# Patient Record
Sex: FEMALE | Race: BLACK OR AFRICAN AMERICAN | Employment: OTHER | URBAN - METROPOLITAN AREA
[De-identification: names, ages, dates, MRNs, and addresses within clinical notes are randomized per-mention and may not be internally consistent; named-entity substitution may affect disease eponyms.]

---

## 2023-07-14 ENCOUNTER — HOSPITAL ENCOUNTER (OUTPATIENT)
Dept: PHYSICAL THERAPY | Age: 68
Setting detail: THERAPIES SERIES
Discharge: HOME | End: 2023-07-14
Payer: MEDICARE

## 2023-07-14 PROCEDURE — 97161 PT EVAL LOW COMPLEX 20 MIN: CPT

## 2023-07-14 PROCEDURE — 97110 THERAPEUTIC EXERCISES: CPT

## 2023-07-14 PROCEDURE — 97140 MANUAL THERAPY 1/> REGIONS: CPT

## 2023-07-14 NOTE — FLOWSHEET NOTE
[facility] - Outpatient Rehabilitation and Therapy, [address], [address] [facility]  Phone: [phone]   Fax:     [phone]      Physical Therapy Treatment Note/ Progress Report:     Date:  2023    Patient Name:  Dorothea Muro    :  1955  MRN: [id number]    Pertinent Medical History:Additional Pertinent Hx: HTN, hepatitis    Medical/Treatment Diagnosis Information:  Medical Diagnosis: Hx of total knee replacement, right [Z96.651]  Treatment Diagnosis: Decreased functional mobility following right TKR    Insurance/Certification information:  PT Insurance Information: [address] Medicare  Physician Information:  Mame Olivarez MD  Plan of care signed (Y/N):     Date of Patient follow up with Physician:      Progress Report: []  Yes  [x]  No     Date Range for reporting period:  Beginnin2023  Ending:      Progress report due (10 Rx/or 30 days whichever is less):      Recertification due (POC duration/ or 90 days whichever is less):      Visit # POC/Insurance Allowable Auth Needed    BOMN []Yes   [x]No     Latex Allergy:  [x]NO      []YES  Preferred Language for Healthcare:   [x]English       []Other:    HPI -    Right TKR 23 - to [facility]  for two weeks, then home therapy for 6 days then sent for OPPT.      SUBJECTIVE:      Relevant Medical History:Additional Pertinent Hx: HTN, hepatitis  Functional Scale/Score: WOMAC  19      Pain Scale: 4/10    Knee ext   -22a/-10p   Knee Flex   100a/107p     Gait - antalgic with RW    RESTRICTIONS/PRECAUTIONS: right TKR    Exercises/Interventions:     Therapeutic Ex (41250)   Min:  30 Reps/Resistance Notes/CUES   Nustep 5 min     Knee flexion step 3 min    HSS step add         Standing HR 10    Standing knee flexion 10         Seated  FAQ   10    Fitter seated 10         Supine  SLR  SAQ  Heel slides   10  10  10         Leg Press  Knee Extension  Knee Flexion

## 2023-07-17 ENCOUNTER — APPOINTMENT (OUTPATIENT)
Dept: PHYSICAL THERAPY | Age: 68
End: 2023-07-17
Payer: MEDICARE

## 2023-07-19 ENCOUNTER — APPOINTMENT (OUTPATIENT)
Dept: PHYSICAL THERAPY | Age: 68
End: 2023-07-19
Payer: MEDICARE

## 2023-07-21 ENCOUNTER — HOSPITAL ENCOUNTER (OUTPATIENT)
Dept: PHYSICAL THERAPY | Age: 68
Setting detail: THERAPIES SERIES
Discharge: HOME | End: 2023-07-21
Payer: MEDICARE

## 2023-07-21 PROCEDURE — 97140 MANUAL THERAPY 1/> REGIONS: CPT

## 2023-07-21 PROCEDURE — 97110 THERAPEUTIC EXERCISES: CPT

## 2023-07-21 NOTE — FLOWSHEET NOTE
[facility] - Outpatient Rehabilitation and Therapy, [address], [address] [facility]  Phone: [phone]   Fax:     [phone]      Physical Therapy Treatment Note/ Progress Report:     Date:  2023    Patient Name:  Onel Marie    :  1955  MRN: [id number]    Pertinent Medical History:Additional Pertinent Hx: HTN, hepatitis    Medical/Treatment Diagnosis Information:  Medical Diagnosis: Hx of total knee replacement, right [Z96.651]  Treatment Diagnosis: Decreased functional mobility following right TKR    Insurance/Certification information:     Physician Information:  Lamar Barkley MD  Plan of care signed (Y/N):     Date of Patient follow up with Physician:      Progress Report: []  Yes  [x]  No     Date Range for reporting period:  Beginnin2023  Ending:      Progress report due (10 Rx/or 30 days whichever is less): 3/89     Recertification due (POC duration/ or 90 days whichever is less):      Visit # POC/Insurance Allowable Auth Needed    BOMN []Yes   [x]No     Latex Allergy:  [x]NO      []YES  Preferred Language for Healthcare:   [x]English       []Other:     Relevant Medical History:Additional Pertinent Hx: HTN, hepatitis  Functional Scale/Score: WOMAC  19     HPI -    Right TKR 23 - to [facility]  for two weeks, then home therapy for 6 days then sent for OPPT. SUBJECTIVE:   -  Pain is slowly getting better. Pt was 3 hours late for appt. Pain  4/10 . Reviewed schedule.      Knee ext   -22a/-10p   Knee Flex   100a/107p     Gait - antalgic with RW    RESTRICTIONS/PRECAUTIONS: right TKR    Therapeutic Ex (09016)   Min:  30 Reps/Resistance Notes/CUES  Right TKR 23   Nustep 5 min     Knee flexion step 3 min    HSS step add         Standing HR 10    Standing knee flexion  Standing abduction  Standing hip flexion 1# 10  1# x 10  1# x 10         Seated  FAQ  Knee flexion   2# 2 x 10  Red

## 2023-07-25 ENCOUNTER — APPOINTMENT (OUTPATIENT)
Dept: CT IMAGING | Age: 68
End: 2023-07-25
Payer: MEDICARE

## 2023-07-25 ENCOUNTER — HOSPITAL ENCOUNTER (OUTPATIENT)
Dept: PHYSICAL THERAPY | Age: 68
Setting detail: THERAPIES SERIES
Discharge: HOME | End: 2023-07-25
Payer: MEDICARE

## 2023-07-25 ENCOUNTER — APPOINTMENT (OUTPATIENT)
Dept: GENERAL RADIOLOGY | Age: 68
End: 2023-07-25
Payer: MEDICARE

## 2023-07-25 ENCOUNTER — HOSPITAL ENCOUNTER (INPATIENT)
Age: 68
LOS: 3 days | Discharge: NURSING, REHAB OR LONG-TERM CARE | End: 2023-07-28
Attending: EMERGENCY MEDICINE | Admitting: INTERNAL MEDICINE
Payer: MEDICARE

## 2023-07-25 DIAGNOSIS — R53.1 RIGHT SIDED WEAKNESS: Primary | ICD-10-CM

## 2023-07-25 DIAGNOSIS — I63.9 ACUTE CEREBROVASCULAR ACCIDENT (CVA) (HCC): ICD-10-CM

## 2023-07-25 DIAGNOSIS — R29.818 FOCAL NEUROLOGICAL DEFICIT: ICD-10-CM

## 2023-07-25 DIAGNOSIS — H53.451 PERIPHERAL VISUAL FIELD DEFECT, RIGHT: ICD-10-CM

## 2023-07-25 LAB
ALBUMIN SERPL-MCNC: 4.5 G/DL (ref 3.4–5)
ALBUMIN/GLOB SERPL: 1.1 {RATIO} (ref 1.1–2.2)
ALP SERPL-CCNC: 95 U/L (ref 40–129)
ALT SERPL-CCNC: 8 U/L (ref 10–40)
ANION GAP SERPL CALCULATED.3IONS-SCNC: 11 MMOL/L (ref 3–16)
AST SERPL-CCNC: 15 U/L (ref 15–37)
BASOPHILS # BLD: 0.1 K/UL (ref 0–0.2)
BASOPHILS NFR BLD: 1.3 %
BILIRUB SERPL-MCNC: 0.3 MG/DL (ref 0–1)
BILIRUB UR QL STRIP.AUTO: NEGATIVE
BUN SERPL-MCNC: 17 MG/DL (ref 7–20)
CA-I BLD-SCNC: 1.39 MMOL/L (ref 1.12–1.32)
CALCIUM SERPL-MCNC: 11.8 MG/DL (ref 8.3–10.6)
CHLORIDE SERPL-SCNC: 101 MMOL/L (ref 99–110)
CLARITY UR: CLEAR
CO2 SERPL-SCNC: 24 MMOL/L (ref 21–32)
COLOR UR: YELLOW
CREAT SERPL-MCNC: 1.1 MG/DL (ref 0.6–1.2)
DEPRECATED RDW RBC AUTO: 15.5 % (ref 12.4–15.4)
EOSINOPHIL # BLD: 0.1 K/UL (ref 0–0.6)
EOSINOPHIL NFR BLD: 1.6 %
EPI CELLS #/AREA URNS HPF: ABNORMAL /HPF (ref 0–5)
GFR SERPLBLD CREATININE-BSD FMLA CKD-EPI: 54 ML/MIN/{1.73_M2}
GLUCOSE BLD-MCNC: 104 MG/DL (ref 70–99)
GLUCOSE SERPL-MCNC: 114 MG/DL (ref 70–99)
GLUCOSE UR STRIP.AUTO-MCNC: NEGATIVE MG/DL
HCT VFR BLD AUTO: 37.4 % (ref 36–48)
HGB BLD-MCNC: 12.1 G/DL (ref 12–16)
HGB UR QL STRIP.AUTO: NEGATIVE
HYALINE CASTS #/AREA URNS LPF: ABNORMAL /LPF (ref 0–2)
KETONES UR STRIP.AUTO-MCNC: NEGATIVE MG/DL
LACTATE BLDV-SCNC: 1 MMOL/L (ref 0.4–2)
LEUKOCYTE ESTERASE UR QL STRIP.AUTO: NEGATIVE
LYMPHOCYTES # BLD: 4 K/UL (ref 1–5.1)
LYMPHOCYTES NFR BLD: 45.6 %
MCH RBC QN AUTO: 25.1 PG (ref 26–34)
MCHC RBC AUTO-ENTMCNC: 32.4 G/DL (ref 31–36)
MCV RBC AUTO: 77.4 FL (ref 80–100)
MONOCYTES # BLD: 0.6 K/UL (ref 0–1.3)
MONOCYTES NFR BLD: 7 %
NEUTROPHILS # BLD: 3.9 K/UL (ref 1.7–7.7)
NEUTROPHILS NFR BLD: 44.5 %
NITRITE UR QL STRIP.AUTO: NEGATIVE
PERFORMED ON: ABNORMAL
PH BLDV: 7.41 [PH] (ref 7.35–7.45)
PH UR STRIP.AUTO: 5.5 [PH] (ref 5–8)
PLATELET # BLD AUTO: 370 K/UL (ref 135–450)
PMV BLD AUTO: 7.4 FL (ref 5–10.5)
POTASSIUM SERPL-SCNC: 3.9 MMOL/L (ref 3.5–5.1)
PROT SERPL-MCNC: 8.6 G/DL (ref 6.4–8.2)
PROT UR STRIP.AUTO-MCNC: 100 MG/DL
RBC # BLD AUTO: 4.84 M/UL (ref 4–5.2)
RBC #/AREA URNS HPF: ABNORMAL /HPF (ref 0–4)
SODIUM SERPL-SCNC: 136 MMOL/L (ref 136–145)
SP GR UR STRIP.AUTO: >=1.03 (ref 1–1.03)
TROPONIN, HIGH SENSITIVITY: 10 NG/L (ref 0–14)
TROPONIN, HIGH SENSITIVITY: 13 NG/L (ref 0–14)
TROPONIN, HIGH SENSITIVITY: 14 NG/L (ref 0–14)
UA COMPLETE W REFLEX CULTURE PNL UR: ABNORMAL
UA DIPSTICK W REFLEX MICRO PNL UR: YES
URN SPEC COLLECT METH UR: ABNORMAL
UROBILINOGEN UR STRIP-ACNC: 0.2 E.U./DL
WBC # BLD AUTO: 8.7 K/UL (ref 4–11)
WBC #/AREA URNS HPF: ABNORMAL /HPF (ref 0–5)

## 2023-07-25 PROCEDURE — 2580000003 HC RX 258: Performed by: INTERNAL MEDICINE

## 2023-07-25 PROCEDURE — 81001 URINALYSIS AUTO W/SCOPE: CPT

## 2023-07-25 PROCEDURE — 85025 COMPLETE CBC W/AUTO DIFF WBC: CPT

## 2023-07-25 PROCEDURE — 2060000000 HC ICU INTERMEDIATE R&B

## 2023-07-25 PROCEDURE — 70450 CT HEAD/BRAIN W/O DYE: CPT

## 2023-07-25 PROCEDURE — 84484 ASSAY OF TROPONIN QUANT: CPT

## 2023-07-25 PROCEDURE — 6360000002 HC RX W HCPCS: Performed by: INTERNAL MEDICINE

## 2023-07-25 PROCEDURE — 83605 ASSAY OF LACTIC ACID: CPT

## 2023-07-25 PROCEDURE — 6370000000 HC RX 637 (ALT 250 FOR IP): Performed by: INTERNAL MEDICINE

## 2023-07-25 PROCEDURE — 36415 COLL VENOUS BLD VENIPUNCTURE: CPT

## 2023-07-25 PROCEDURE — 82330 ASSAY OF CALCIUM: CPT

## 2023-07-25 PROCEDURE — 80053 COMPREHEN METABOLIC PANEL: CPT

## 2023-07-25 PROCEDURE — 99285 EMERGENCY DEPT VISIT HI MDM: CPT

## 2023-07-25 PROCEDURE — 6370000000 HC RX 637 (ALT 250 FOR IP): Performed by: EMERGENCY MEDICINE

## 2023-07-25 PROCEDURE — 71045 X-RAY EXAM CHEST 1 VIEW: CPT

## 2023-07-25 RX ORDER — ASPIRIN 81 MG/1
81 TABLET ORAL DAILY
Status: DISCONTINUED | OUTPATIENT
Start: 2023-07-26 | End: 2023-07-28 | Stop reason: HOSPADM

## 2023-07-25 RX ORDER — POLYETHYLENE GLYCOL 3350 17 G/17G
17 POWDER, FOR SOLUTION ORAL DAILY PRN
Status: DISCONTINUED | OUTPATIENT
Start: 2023-07-25 | End: 2023-07-28 | Stop reason: HOSPADM

## 2023-07-25 RX ORDER — ASPIRIN 300 MG/1
300 SUPPOSITORY RECTAL DAILY
Status: DISCONTINUED | OUTPATIENT
Start: 2023-07-26 | End: 2023-07-28 | Stop reason: HOSPADM

## 2023-07-25 RX ORDER — ONDANSETRON 2 MG/ML
4 INJECTION INTRAMUSCULAR; INTRAVENOUS EVERY 6 HOURS PRN
Status: DISCONTINUED | OUTPATIENT
Start: 2023-07-25 | End: 2023-07-28 | Stop reason: HOSPADM

## 2023-07-25 RX ORDER — ENOXAPARIN SODIUM 100 MG/ML
40 INJECTION SUBCUTANEOUS NIGHTLY
Status: DISCONTINUED | OUTPATIENT
Start: 2023-07-25 | End: 2023-07-28 | Stop reason: HOSPADM

## 2023-07-25 RX ORDER — 0.9 % SODIUM CHLORIDE 0.9 %
1000 INTRAVENOUS SOLUTION INTRAVENOUS ONCE
Status: DISCONTINUED | OUTPATIENT
Start: 2023-07-25 | End: 2023-07-28 | Stop reason: HOSPADM

## 2023-07-25 RX ORDER — ASPIRIN 81 MG/1
324 TABLET, CHEWABLE ORAL ONCE
Status: COMPLETED | OUTPATIENT
Start: 2023-07-25 | End: 2023-07-25

## 2023-07-25 RX ORDER — ATORVASTATIN CALCIUM 80 MG/1
80 TABLET, FILM COATED ORAL NIGHTLY
Status: DISCONTINUED | OUTPATIENT
Start: 2023-07-25 | End: 2023-07-28 | Stop reason: HOSPADM

## 2023-07-25 RX ORDER — ONDANSETRON 4 MG/1
4 TABLET, ORALLY DISINTEGRATING ORAL EVERY 8 HOURS PRN
Status: DISCONTINUED | OUTPATIENT
Start: 2023-07-25 | End: 2023-07-28 | Stop reason: HOSPADM

## 2023-07-25 RX ORDER — SODIUM CHLORIDE 9 MG/ML
INJECTION, SOLUTION INTRAVENOUS CONTINUOUS
Status: DISCONTINUED | OUTPATIENT
Start: 2023-07-25 | End: 2023-07-26

## 2023-07-25 RX ADMIN — ENOXAPARIN SODIUM 40 MG: 100 INJECTION SUBCUTANEOUS at 20:46

## 2023-07-25 RX ADMIN — ASPIRIN 324 MG: 81 TABLET, CHEWABLE ORAL at 16:25

## 2023-07-25 RX ADMIN — SODIUM CHLORIDE: 9 INJECTION, SOLUTION INTRAVENOUS at 20:28

## 2023-07-25 RX ADMIN — ATORVASTATIN CALCIUM 80 MG: 80 TABLET, FILM COATED ORAL at 20:45

## 2023-07-25 ASSESSMENT — ENCOUNTER SYMPTOMS
NAUSEA: 0
FACIAL SWELLING: 0
STRIDOR: 0
WHEEZING: 0
ABDOMINAL PAIN: 0
TROUBLE SWALLOWING: 0
SHORTNESS OF BREATH: 0
COLOR CHANGE: 0
VOMITING: 0
VOICE CHANGE: 0

## 2023-07-25 NOTE — ED NOTES
Unable to establish ultrasound IV access at this time. MD aware. MD states he will attempt to establish IV access when patient care allows. Bill EMT-P will also attempt to establish IV access.      Aziza Maynard RN  07/25/23 9428

## 2023-07-25 NOTE — PROGRESS NOTES
4 Eyes Skin Assessment     NAME:  Carlie Mccarthy  YOB: 1955  MEDICAL RECORD NUMBER:  [id number]    The patient is being assessed for  Admission    I agree that at least one RN has performed a thorough Head to Toe Skin Assessment on the patient. ALL assessment sites listed below have been assessed. Areas assessed by both nurses:    Head, Face, Ears, Shoulders, Back, Chest, Arms, Elbows, Hands, Sacrum. Buttock, Coccyx, Ischium, Legs. Feet and Heels, and Under Medical Devices         Does the Patient have a Wound?  No noted wound(s)       Ney Prevention initiated by RN: Yes  Wound Care Orders initiated by RN: No    Pressure Injury (Stage 3,4, Unstageable, DTI, NWPT, and Complex wounds) if present, place Wound referral order by RN under : No    New Ostomies, if present place, Ostomy referral order under : No     Nurse 1 eSignature: Electronically signed by Carloz Raymundo RN on 7/25/23 at 7:07 PM EDT    **SHARE this note so that the co-signing nurse can place an eSignature**    Nurse 2 eSignature: Electronically signed by Emani Gutierrez RN on 7/25/23 at 7:26 PM EDT

## 2023-07-25 NOTE — ED NOTES
During RN triage and initial NIHSS screen:    Patient initially states she believes the change started around 0400 this AM.     Patient verbalizes change in sensation between right leg and left leg. States arms and facial sensation are equal bilaterally. Also noted were a slight right arm drift and inability to hold right leg off of bed. Patient is alert and oriented but has difficulty remembering things such as her medications and where her pharmacy is. States she does not normally have memory issues. Patient able to hold leg with some drift during MD exam and reports equal sensation to bilateral lower extremities. Patient also reports being unsure when she last felt completely normal, states she slept all day yesterday, but felt normal 2 days ago. Patient is able to ambulate with walker and assistance, states she still feels weaker on right side and feels like she is leaning to the right. Patient states this is a change from how she has been feeling at physical therapy, states therapy has been improving.      Martín Luna RN  07/25/23 3938

## 2023-07-25 NOTE — ED NOTES
Per CT Patient's line extravasated during contrast injection. States 89mL extravasation. Per Molly Mckenzie in pharmacy, apply ice to site for 30 minutes q4 hours and elevate extremity. States may order 1% hydrocortisone cream if site because very red or irritated. MD aware of loss of IV access and pharmacy instructions. This RN to bedside to attempt ultrasound guided IV access with Janelle Castillo RN at bedside to supervise.      Ovi Casper RN  07/25/23 7040

## 2023-07-25 NOTE — PROGRESS NOTES
EDSBAR report has been reviewed. Please come up to do handoff NIH.  Electronically signed by Anthony Uriarter, RN on 7/25/23 at 4:47 PM EDT

## 2023-07-25 NOTE — ED NOTES
EDMD states he is unable to attempt IV access at this time, states no central line placement required at this time. Kane Johnson RN to attempt access.       Rogelio Painter RN  07/25/23 7416

## 2023-07-25 NOTE — PROGRESS NOTES
Pt arrived to room 5117 at. /99 all other VSS. Pt has no pain at this time. Pt oriented to room and call light. CMU called and verified pt and rhythm. [facility] completed.  Electronically signed by Emani Gutierrez RN on 7/25/23 at 7:02 PM EDT

## 2023-07-25 NOTE — ED NOTES
[facility] at bedside to transfer patient to [facility]. EDMD to bedside to attempt IV placement. Report given to EMS at this time, all questions answered.      Kimberley Case RN  07/25/23 7888

## 2023-07-25 NOTE — ED NOTES
Per kurt BERRY to send urine sample from bedpan. Patient assisted onto bedpan. Patient's underwear noted to be wet. Patient provided with fresh diaper and assisted to change.  Yellow skidless socks placed due to patient shoe removal.     Morena Ram RN  07/25/23 5399

## 2023-07-25 NOTE — FLOWSHEET NOTE
[facility] - Outpatient Rehabilitation and Therapy, [facility]  [address] [place] [address], [address] [facility]  Phone: [phone]   Fax:     [phone]      Physical Therapy Treatment Note/ Progress Report:     Date:  2023    Patient Name:  Onel Marie    :  1955  MRN: [id number]    Pertinent Medical History:Additional Pertinent Hx: HTN, hepatitis    Medical/Treatment Diagnosis Information:  Medical Diagnosis: Hx of total knee replacement, right [Z96.651]  Treatment Diagnosis: Decreased functional mobility following right TKR    Insurance/Certification information:     Physician Information:  Lamar Barkley MD  Plan of care signed (Y/N):     Date of Patient follow up with Physician:      Progress Report: []  Yes  [x]  No     Date Range for reporting period:  Beginnin2023  Ending:      Progress report due (10 Rx/or 30 days whichever is less):      Recertification due (POC duration/ or 90 days whichever is less):      Visit # POC/Insurance Allowable Auth Needed   3 / 12 BOMN []Yes   [x]No     Latex Allergy:  [x]NO      []YES  Preferred Language for Healthcare:   [x]English       []Other:     Relevant Medical History:Additional Pertinent Hx: HTN, hepatitis  Functional Scale/Score: WOMAC  19     HPI -    Right TKR 23 - to [facility]  for two weeks, then home therapy for 6 days then sent for OPPT. SUBJECTIVE:   -  Pain is slowly getting better. Pt was 3 hours late for appt. Pain  4/10 . Reviewed schedule.  -  Pt arrived for appt - walked back to gym - PT noted she was not moving well - pt reported feeling stiff and having a difficult day. Placed on Nustep -  Done without difficulty.   When transferring to the chair , this PT noted that pt was not consistently using right arm and leg as per normal.  Pt reports taking BP meds per usual -  PT  178/102 - no facial droop - able to move both limbs actively when asked but

## 2023-07-25 NOTE — ED PROVIDER NOTES
[address] [facility]  eMERGENCY dEPARTMENT eNCOUnter      Pt Name: Jessica Molina  MRN: [id number]  [address] [facility] 1955  Date of evaluation: 7/25/2023  Provider: Paola Alejo MD    [address]       Chief Complaint   Patient presents with    Other     States \" I don't feel right, I can't describe it. \" States she noticed it started around 0400 this AM. Reports having knee surgery on June 7th. Brought today ED by PT. States last felt normal for sure 2 days ago. States she slept all day yesterday. Reports right arm and leg feels weaker than normal. Minor drift to right arm, some effort against gravity to right leg. Patient alert and oriented. HISTORY OF PRESENT ILLNESS   (Location/Symptom, Timing/Onset, Context/Setting, Quality, Duration, Modifying Factors, Severity)  Note limiting factors. History obtained from: the patient     Jessica Molina is a 70 y.o. female with history of hypertension hepatitis who presents with right-sided weakness. Patient had knee surgery on June 7 and has been doing physical therapy since. Patient reports the last time that she knows that she felt at her normal was 2 days ago. Patient reports she slept all day yesterday and felt weak. Patient was able to get up and go to her physical therapy appointment today but noticed significant right-sided weakness when getting to the car. Her physical therapist did notice right-sided weakness and therefore advise she come to the emergency department for evaluation. The patient reports while her right-sided weakness became more apparent to her at 4 AM today her last known normal was 2 days ago as she definitely felt weak and abnormal yesterday reporting she slept all day and states that the last time that she is sure she was at her normal baseline was 2 days ago. Patient denies any fever or trauma. HPI    Nursing Notes were reviewed.     REVIEW OFSYSTEMS    (2-9 systems for level 4, 10 or more for level 5) Review of Systems   Constitutional:  Negative for appetite change, fever and unexpected weight change. HENT:  Negative for facial swelling, trouble swallowing and voice change. Eyes:  Negative for visual disturbance. Respiratory:  Negative for shortness of breath, wheezing and stridor. Cardiovascular:  Negative for chest pain and palpitations. Gastrointestinal:  Negative for abdominal pain, nausea and vomiting. Genitourinary:  Negative for dysuria and vaginal bleeding. Musculoskeletal:  Negative for neck pain and neck stiffness. Skin:  Negative for color change and wound. Neurological:  Positive for weakness. Negative for seizures and syncope. Psychiatric/Behavioral:  Negative for self-injury and suicidal ideas. Except as noted above the remainder of the review of systems was reviewed and negative. PAST MEDICAL HISTORY     Past Medical History:   Diagnosis Date    Hepatitis     Hypertension          SURGICAL HISTORY       Past Surgical History:   Procedure Laterality Date    HYSTERECTOMY (CERVIX STATUS UNKNOWN)      KNEE SURGERY Right 2023    Right knee replacement    TUMOR EXCISION           CURRENT MEDICATIONS       Previous Medications    HYDROCHLOROTHIAZIDE (HYDRODIURIL) 25 MG TABLET    Take 25 mg by mouth daily. LORATADINE (CLARITIN) 10 MG CAPS    Take  by mouth.        ALLERGIES     Erythromycin and Nsaids    FAMILY HISTORY       Family History   Problem Relation Age of Onset    Hypertension Mother     Cancer Mother     Hypertension Brother           SOCIAL HISTORY       Social History     Socioeconomic History    Marital status: Single     Spouse name: None    Number of children: None    Years of education: None    Highest education level: None   Tobacco Use    Smoking status: Every Day     Years: 40.00     Types: Cigarettes   Substance and Sexual Activity    Alcohol use: Yes     Comment: occasionally         PHYSICAL EXAM    (up to 7 for level 4, 8 or more for level 5)

## 2023-07-25 NOTE — ED TRIAGE NOTES
Patient presents to ED complaining of \" I don't feel right, I can't describe it. \" States she noticed it started around 0400 this AM. Reports having knee surgery on June 7th. Brought today ED by PT. States last felt normal for sure 2 days ago. States she slept all day yesterday. Reports right arm and leg feels weaker than normal. Minor drift to right arm, some effort against gravity to right leg. Patient alert and oriented. MD notified and to bedside to assess. Patient resting on bed, respirations even and easy at this time. No obvious distress.

## 2023-07-25 NOTE — ED NOTES
ED SBAR report provider to [facility], [address] [place] [address]. Patient to be transported to Room 5117 via stretcher by  [facility] . Patient transported with bedside cardiac monitor. IV site clean, dry, and intact. Updated patient on plan of care.        Charleen Corrigan RN  07/25/23 2687

## 2023-07-25 NOTE — ED NOTES
Call from transfer center, [facility] within 2 hours to transport patient to [facility] Qian Arreguin RN  07/25/23 2659

## 2023-07-26 ENCOUNTER — APPOINTMENT (OUTPATIENT)
Dept: MRI IMAGING | Age: 68
End: 2023-07-26
Payer: MEDICARE

## 2023-07-26 ENCOUNTER — APPOINTMENT (OUTPATIENT)
Dept: CT IMAGING | Age: 68
End: 2023-07-26
Payer: MEDICARE

## 2023-07-26 LAB
ANION GAP SERPL CALCULATED.3IONS-SCNC: 14 MMOL/L (ref 3–16)
BUN SERPL-MCNC: 17 MG/DL (ref 7–20)
CALCIUM SERPL-MCNC: 10.6 MG/DL (ref 8.3–10.6)
CHLORIDE SERPL-SCNC: 103 MMOL/L (ref 99–110)
CHOLEST SERPL-MCNC: 214 MG/DL (ref 0–199)
CO2 SERPL-SCNC: 19 MMOL/L (ref 21–32)
CREAT SERPL-MCNC: 1.1 MG/DL (ref 0.6–1.2)
DEPRECATED RDW RBC AUTO: 16 % (ref 12.4–15.4)
EST. AVERAGE GLUCOSE BLD GHB EST-MCNC: 148.5 MG/DL
GFR SERPLBLD CREATININE-BSD FMLA CKD-EPI: 54 ML/MIN/{1.73_M2}
GLUCOSE SERPL-MCNC: 104 MG/DL (ref 70–99)
HBA1C MFR BLD: 6.8 %
HCT VFR BLD AUTO: 41.7 % (ref 36–48)
HDLC SERPL-MCNC: 31 MG/DL (ref 40–60)
HGB BLD-MCNC: 12.9 G/DL (ref 12–16)
LDLC SERPL CALC-MCNC: 134 MG/DL
LV EF: 70 %
LVEF MODALITY: NORMAL
MCH RBC QN AUTO: 24.6 PG (ref 26–34)
MCHC RBC AUTO-ENTMCNC: 31 G/DL (ref 31–36)
MCV RBC AUTO: 79.5 FL (ref 80–100)
PLATELET # BLD AUTO: 208 K/UL (ref 135–450)
PMV BLD AUTO: 7.9 FL (ref 5–10.5)
POTASSIUM SERPL-SCNC: 3.7 MMOL/L (ref 3.5–5.1)
RBC # BLD AUTO: 5.25 M/UL (ref 4–5.2)
SODIUM SERPL-SCNC: 136 MMOL/L (ref 136–145)
TRIGL SERPL-MCNC: 244 MG/DL (ref 0–150)
VLDLC SERPL CALC-MCNC: 49 MG/DL
WBC # BLD AUTO: 7.5 K/UL (ref 4–11)

## 2023-07-26 PROCEDURE — 6360000004 HC RX CONTRAST MEDICATION: Performed by: INTERNAL MEDICINE

## 2023-07-26 PROCEDURE — 80061 LIPID PANEL: CPT

## 2023-07-26 PROCEDURE — 80048 BASIC METABOLIC PNL TOTAL CA: CPT

## 2023-07-26 PROCEDURE — 83036 HEMOGLOBIN GLYCOSYLATED A1C: CPT

## 2023-07-26 PROCEDURE — 85027 COMPLETE CBC AUTOMATED: CPT

## 2023-07-26 PROCEDURE — 92523 SPEECH SOUND LANG COMPREHEN: CPT

## 2023-07-26 PROCEDURE — 36415 COLL VENOUS BLD VENIPUNCTURE: CPT

## 2023-07-26 PROCEDURE — 97166 OT EVAL MOD COMPLEX 45 MIN: CPT

## 2023-07-26 PROCEDURE — 70498 CT ANGIOGRAPHY NECK: CPT

## 2023-07-26 PROCEDURE — 6370000000 HC RX 637 (ALT 250 FOR IP): Performed by: INTERNAL MEDICINE

## 2023-07-26 PROCEDURE — 2060000000 HC ICU INTERMEDIATE R&B

## 2023-07-26 PROCEDURE — 97116 GAIT TRAINING THERAPY: CPT | Performed by: PHYSICAL THERAPIST

## 2023-07-26 PROCEDURE — 92610 EVALUATE SWALLOWING FUNCTION: CPT

## 2023-07-26 PROCEDURE — 97535 SELF CARE MNGMENT TRAINING: CPT

## 2023-07-26 PROCEDURE — 2580000003 HC RX 258: Performed by: INTERNAL MEDICINE

## 2023-07-26 PROCEDURE — 6360000002 HC RX W HCPCS: Performed by: INTERNAL MEDICINE

## 2023-07-26 PROCEDURE — 97162 PT EVAL MOD COMPLEX 30 MIN: CPT | Performed by: PHYSICAL THERAPIST

## 2023-07-26 PROCEDURE — 93306 TTE W/DOPPLER COMPLETE: CPT

## 2023-07-26 PROCEDURE — 97530 THERAPEUTIC ACTIVITIES: CPT

## 2023-07-26 PROCEDURE — 70551 MRI BRAIN STEM W/O DYE: CPT

## 2023-07-26 PROCEDURE — 97530 THERAPEUTIC ACTIVITIES: CPT | Performed by: PHYSICAL THERAPIST

## 2023-07-26 RX ORDER — GABAPENTIN 100 MG/1
100 CAPSULE ORAL 2 TIMES DAILY
Status: ON HOLD | COMMUNITY
End: 2023-07-27 | Stop reason: HOSPADM

## 2023-07-26 RX ORDER — LOSARTAN POTASSIUM 50 MG/1
50 TABLET ORAL DAILY
Status: DISCONTINUED | OUTPATIENT
Start: 2023-07-26 | End: 2023-07-28 | Stop reason: HOSPADM

## 2023-07-26 RX ORDER — LOSARTAN POTASSIUM 50 MG/1
50 TABLET ORAL DAILY
Status: ON HOLD | COMMUNITY
End: 2023-08-03 | Stop reason: SDUPTHER

## 2023-07-26 RX ORDER — CHLORTHALIDONE 25 MG/1
25 TABLET ORAL DAILY
Status: ON HOLD | COMMUNITY
End: 2023-08-03 | Stop reason: SDUPTHER

## 2023-07-26 RX ORDER — ABACAVIR SULFATE, DOLUTEGRAVIR SODIUM, LAMIVUDINE 600; 50; 300 MG/1; MG/1; MG/1
TABLET, FILM COATED ORAL DAILY
COMMUNITY

## 2023-07-26 RX ADMIN — SODIUM CHLORIDE: 9 INJECTION, SOLUTION INTRAVENOUS at 09:34

## 2023-07-26 RX ADMIN — LOSARTAN POTASSIUM 50 MG: 50 TABLET, FILM COATED ORAL at 15:05

## 2023-07-26 RX ADMIN — ATORVASTATIN CALCIUM 80 MG: 80 TABLET, FILM COATED ORAL at 20:39

## 2023-07-26 RX ADMIN — ENOXAPARIN SODIUM 40 MG: 100 INJECTION SUBCUTANEOUS at 22:21

## 2023-07-26 RX ADMIN — ASPIRIN 81 MG: 81 TABLET, COATED ORAL at 09:30

## 2023-07-26 RX ADMIN — IOPAMIDOL 75 ML: 755 INJECTION, SOLUTION INTRAVENOUS at 08:44

## 2023-07-26 NOTE — PROGRESS NOTES
SLP NOTES    Evaluation attempted. Patient completing ECHO. ST to re-attempt as schedule permits unless otherwise notified. If SLP eval/tx is deemed no longer indicated as medical work-up progresses, please discontinue SLP eval/tx order. Thank you.   Marc Rod MA [facility]-SLP #[id number]  Speech-Language Pathologist  Portable Phone: [phone]  07/26/23  10:39am

## 2023-07-26 NOTE — PROGRESS NOTES
Physical Therapy  Facility/Department: [facility] PROGRESSIVE CARE  Physical Therapy Initial Assessment    Name: Prabhakar Light  : 1955  MRN: [id number]  Date of Service: 2023    Discharge Recommendations:  IP Rehab   PT Equipment Recommendations  Equipment Needed: No      Prabhakar Light scored a 17/24 on the AM-PAC short mobility form. Current research shows that an AM-PAC score of 17 or less is typically not associated with a discharge to the patient's home setting. Based on the patient's AM-PAC score and their current functional mobility deficits, it is recommended that the patient have 5-7 sessions per week of Physical Therapy at d/c to increase the patient's independence. At this time, this patient demonstrates complex nursing, medical, and rehabilitative needs, and would benefit from intensive rehabilitation services upon discharge from the Inpatient setting. This patient demonstrates the ability to participate in and benefit from an intensive therapy program with a coordinated interdisciplinary team approach to foster frequent, structured, and documented communication among disciplines, who will work together to establish, prioritize, and achieve treatment goals. Please see assessment section for further patient specific details. If patient discharges prior to next session this note will serve as a discharge summary. Please see below for the latest assessment towards goals. Patient Diagnosis(es): The primary encounter diagnosis was Right sided weakness. Diagnoses of Peripheral visual field defect, right and Focal neurological deficit were also pertinent to this visit. Past Medical History:  has a past medical history of Hepatitis and Hypertension. Past Surgical History:  has a past surgical history that includes Hysterectomy; tumor excision; and knee surgery (Right, ).     Assessment   Body Structures, Functions, Activity Limitations Requiring Skilled Therapeutic Intervention: Decreased

## 2023-07-26 NOTE — PROGRESS NOTES
Facility/Department: [address] PROGRESSIVE CARE  SLP Clinical Swallow Evaluation and Speech Language Cognitive Assessment     Patient: Quyen Morse   : 1955   MRN: [id number]      Evaluation Date: 2023      Admitting Dx: Focal neurological deficit [R29.818]  Right sided weakness [R53.1]  Peripheral visual field defect, right [H53.451]  Acute cerebrovascular accident (CVA) ([address]) [I63.9]  Pain: Did not state                                    Chart Review:   H&P:   The patient is a 70 y.o. female who presents to [facility] with weakness. Pt states she woke up this am not feeling well. She is unable to describe, just didn't feel weel and hence presented to the ER. CT head showed acute cva. Admitted for further work-up. Current diet: regular/thin    Imaging:  Chest X-ray: 2023  IMPRESSION:  No radiographic evidence of acute pulmonary disease. Head CT: 2023  IMPRESSION:  Small focal area of abnormal low-attenuation in posterior left occipital  lobe. Findings suspicious representing subtle area of recent ischemic change  as described above. Follow-up MRI examination with diffusion imaging may be  helpful for more complete evaluation. Brain MRI: 2023  IMPRESSION:  Acute to subacute small cortical infarcts throughout the left parietal and  occipital lobes. Additional small scattered acute to subacute infarcts  throughout the bilateral frontal lobes with involvement of the deep white  matter. No acute hemorrhage or significant mass effect. Modified Barium Swallow Study: n/a per EMR review    History/Prior Level of Function:   Living Status: admitted from home; lives alone  Prior Dysphagia History: n/a per pt report  Prior Speech History: n/a per pt report    Reason for referral: SLP evaluation orders received due to new CVA .     DYSPHAGIA BEDSIDE SWALLOW EVALUATION   Dysphagia Impressions/Dysphagia Diagnosis: Oropharyngeal Dysphagia   Dysphagia Diagnosis:   Pt awake, alert, oriented x4. Accepting of assessment and follows directions WFL. Oral motor exam revealed pt to have own dentition with several missing teeth. Concern for poor oral care. Lingual/labial strength appeared WFL; ROM and coordination appeared mildly reduced. Pt able to elicit a volitional swallow and throat clear. Mild tyra-pharyngeal dysphagia characterized by concern for premature spillage to pharynx with liquids, subjectively delayed initiation, reduced laryngeal elevation. No overt s/s aspiration with any PO trials on this date. Recommend continue with current diet of regular/thin at this time with monitor for any s/s aspiration or changes in respiratory status, as silent aspiration cannot be ruled out during a bedside assessment. If MD concern for silent aspiration, MBSS can be completed with physician order. SLP to follow. 2. Factors that may exacerbate: reduced insight, medical concerns    Recommended Diet and Intervention:  Diet Solids Recommendation:  Regular texture diet  Liquid Consistency Recommendation:   Thin liquids  Recommended form of Meds: Meds whole with water      Dysphagia Therapeutic Intervention:  Diet Tolerance Monitoring , Patient/Family Education     Compensatory Swallowing Strategies:  Upright as possible with all PO intake , Eat/feed slowly, Remain upright 30-45 min     TEST DATA:   Patient Positioning: Upright in bed     Consistencies presented: thin liquids; puree; soft/bite size; regular    Oral Mechanism Exam:  []WFL [x]Mild   [] Moderate  []Severe    Labial ROM  Labial Coordination   Lingual ROM  Lingual Coordination   Voltional cough: adequate  Volitional swallow: adequate; delayed  Voice: WFL    Oral Phase: []WFL [x]Mild   [] Moderate  []Severe  []To be assessed   []Impaired/Prolonged Mastication:   []Spillage Left:   []Spillage Right:  []Pocketing Left:   []Pocketing Right:   []Decreased Anterior to Posterior Transit:   [x]Suspected Premature Bolus Loss:   []Lingual/Palatal

## 2023-07-26 NOTE — CARE COORDINATION
Case Management Assessment  Initial Evaluation    Date/Time of Evaluation: 7/26/2023 11:32 AM  Assessment Completed by: HERNAN Galeano    If patient is discharged prior to next notation, then this note serves as note for discharge by case management. Patient Name: Prabhakar Light                   YOB: 1955  Diagnosis: Focal neurological deficit [R29.818]  Right sided weakness [R53.1]  Peripheral visual field defect, right [H53.451]  Acute cerebrovascular accident (CVA) ([address]) [I63.9]                   Date / Time: 7/25/2023  1:21 PM    Patient Admission Status: Inpatient   Readmission Risk (Low < 19, Mod (19-27), High > 27): Readmission Risk Score: 13.7    Current PCP: Josefina Knox  PCP verified by CM? Yes    Chart Reviewed: Yes      History Provided by: Patient  Patient Orientation: Alert and Oriented    Patient Cognition: Alert    Hospitalization in the last 30 days (Readmission):  No    If yes, Readmission Assessment in CM Navigator will be completed. Advance Directives:      Code Status: Full Code   Patient's Primary Decision Maker is: Legal Next of Kin      Discharge Planning:    Patient lives with: Alone Type of Home: Apartment  Primary Care Giver: Self  Patient Support Systems include: Family Members   Current Financial resources: Medicare  Current community resources: None  Current services prior to admission: None            Current DME:  rachel Bolden            Type of Home Care services:  PT    ADLS  Prior functional level: Independent in ADLs/IADLs  Current functional level: Other (see comment) (May need assistance, was unsure)        Family can provide assistance at DC: Yes  Would you like Case Management to discuss the discharge plan with any other family members/significant others, and if so, who?  No  Plans to Return to Present Housing: Yes  Other Identified Issues/Barriers to RETURNING to current housing: None  Potential Assistance needed at discharge: [address] [place] [address]

## 2023-07-26 NOTE — PROGRESS NOTES
/ Impairments: Decreased functional mobility ; Decreased strength;Decreased endurance;Decreased ADL status; Decreased safe awareness;Decreased high-level IADLs;Decreased balance;Decreased ROM; Decreased vision/visual deficit; Decreased coordination;Decreased fine motor control  Assessment: 70 y.o. female who presented with 2 days of right side weakness. MRI revealed multifocal small acute to subacute infarcts highly suggestive of cardioembolic etiology. Pt with history of R knee replacement 6/7/2023, discharged to [facility] 6/12-6/23 and returned home. Pt reports she lives at home alone and was independent with ADLs and functional mobility with RW. Today, pt presents with intermittent difficulty with word finding and appears to have R side field cut. Pt completed ADL transfers and functional mobility around room/bathroom with RW and CGA/min A as pt tends to run into objects on R side. Pt with functional vicky UE ROM (L > R) and anticipate will require up to min A for LB ADLs. Pt is functioning below baseline and benefit from skilled therapy prior to returning home. Prognosis: Good  Decision Making: Medium Complexity  REQUIRES OT FOLLOW-UP: Yes  Activity Tolerance  Activity Tolerance: Patient Tolerated treatment well        Plan   Occupational Therapy Plan  Times Per Week: 3-5  Current Treatment Recommendations: Strengthening, Balance training, Functional mobility training, Endurance training, ROM, Gait training, Neuromuscular re-education, Patient/Caregiver education & training, Self-Care / ADL, Equipment evaluation, education, & procurement, Coordination training     Restrictions  Restrictions/Precautions  Restrictions/Precautions: Fall Risk  Position Activity Restriction  Other position/activity restrictions: R field cut    Subjective   General  Chart Reviewed: Yes  Patient assessed for rehabilitation services?: Yes  Additional Pertinent Hx: 70 y.o. female who presented with 2 days of right side weakness.   MRI revealed multifocal small acute to subacute infarcts highly suggestive of cardioembolic etiology. Pt with history of R knee replacement 6/7/2023, discharged to [facility] 6/12-6/23 and returned home. Family / Caregiver Present: Yes (daughter)  Referring Practitioner: Carolin Gorman MD  Subjective  Subjective: Pt seen bedside and agreeable to therapy. General Comment  Comments: Per RN ok for therapy     Social/Functional History  Social/Functional History  Lives With: Alone  Type of Home: Apartment  Home Layout: One level, Laundry in basement  Home Access: Stairs to enter with rails  Entrance Stairs - Number of Steps: 4 steps to enter with Leo railings in front; 1 IONA with rail in back  Bathroom Shower/Tub: Tub/Shower unit  [employer]: Standard  Bathroom Equipment: Grab bars in shower, Shower chair, Hand-held shower  Bathroom Accessibility: Accessible  Home Equipment: Walker, rolling  ADL Assistance: Independent  Ambulation Assistance: Independent  Transfer Assistance: Independent  Occupation: Retired  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[address]: Call light within reach;Nurse notified; Left in chair;Gait belt; Chair alarm in place     Toilet Transfers  Toilet - Technique: Ambulating  Equipment Used: Standard toilet  Toilet Transfer: Contact guard assistance  Toilet Transfers Comments: grab bar support    AROM: Within functional limits  Strength: Generally decreased, functional (RUE slightly weaker than LUE but functional)  Coordination: Generally decreased, functional (RUE slightly dec coordinated than LUE but functional)  Tone: Normal  Sensation: Intact    ADL  LE Dressing: Minimal assistance (able to cross LE over knee to osmany socks with inc effort)  Toileting: Minimal assistance (assist with cloth management up/down, pt able to complete aayush care via lateral weightshifts)  Additional Comments: Anticipate pt will require CGA/min A for all ADLs based on

## 2023-07-26 NOTE — CARE COORDINATION
The Plan for Transition of Care is related to the following treatment goals: Strengthening     The Patient and family were provided with a choice of provider and agrees   with the discharge plan. [x] Yes [] No    Freedom of choice list was provided with basic dialogue that supports the patient's individualized plan of care/goals, treatment preferences and shares the quality data associated with the providers. [x] Yes [] No        PT/OT referred for ARU, will need PMR consult. Pt family are agreeable for [facility]. Electronically signed by HERNAN Ibrahim on 7/26/2023 at 4:08 PM  PT/OT has reffered Pt for ARU, family in agreeance.

## 2023-07-26 NOTE — PLAN OF CARE
Problem: Discharge Planning  Goal: Discharge to home or other facility with appropriate resources  7/26/2023 1551 by Chris Dumont RN  Outcome: Progressing     Problem: Safety - Adult  Goal: Free from fall injury  7/26/2023 1551 by Chris Dumont RN  Outcome: Progressing     Problem: Skin/Tissue Integrity  Goal: Absence of new skin breakdown  Description: 1. Monitor for areas of redness and/or skin breakdown  2. Assess vascular access sites hourly  3. Every 4-6 hours minimum:  Change oxygen saturation probe site  4. Every 4-6 hours:  If on nasal continuous positive airway pressure, respiratory therapy assess nares and determine need for appliance change or resting period.   7/26/2023 1551 by Chris Dumont RN  Outcome: Progressing     Problem: Neurosensory - Adult  Goal: Achieves stable or improved neurological status  7/26/2023 1551 by Chris Dumont RN  Outcome: Progressing     Problem: Neurosensory - Adult  Goal: Achieves maximal functionality and self care  7/26/2023 1551 by Chris Dumont RN  Outcome: Progressing     Problem: Chronic Conditions and Co-morbidities  Goal: Patient's chronic conditions and co-morbidity symptoms are monitored and maintained or improved  Outcome: Progressing

## 2023-07-26 NOTE — ACP (ADVANCE CARE PLANNING)
Advance Care Planning     Advance Care Planning Activator (Inpatient)  Conversation Note      Date of ACP Conversation: 7/26/2023     Conversation Conducted with: Patient with Decision Making Capacity    ACP Activator: HERNAN Dolan        Health Care Decision Maker:     Current Designated Health Care Decision Maker: Today we documented Decision Maker(s) consistent with Legal Next of Kin hierarchy. Care Preferences    Ventilation: \"If you were in your present state of health and suddenly became very ill and were unable to breathe on your own, what would your preference be about the use of a ventilator (breathing machine) if it were available to you? \"      Would the patient desire the use of ventilator (breathing machine)?: yes    \"If your health worsens and it becomes clear that your chance of recovery is unlikely, what would your preference be about the use of a ventilator (breathing machine) if it were available to you? \"     Would the patient desire the use of ventilator (breathing machine)?: Yes      Resuscitation  \"CPR works best to restart the heart when there is a sudden event, like a heart attack, in someone who is otherwise healthy. Unfortunately, CPR does not typically restart the heart for people who have serious health conditions or who are very sick. \"    \"In the event your heart stopped as a result of an underlying serious health condition, would you want attempts to be made to restart your heart (answer \"yes\" for attempt to resuscitate) or would you prefer a natural death (answer \"no\" for do not attempt to resuscitate)? \" yes       [] Yes   [] No   Educated Patient / Meagan Trammell regarding differences between Advance Directives and portable DNR orders.     Length of ACP Conversation in minutes:  3 Minutes    Conversation Outcomes:  ACP discussion completed    Follow-up plan:    [] Schedule follow-up conversation to continue planning  [] Referred individual to Provider for additional questions/concerns   [] Advised patient/agent/surrogate to review completed ACP document and update if needed with changes in condition, patient preferences or care setting    [x] This note routed to one or more involved healthcare providers    Electronically signed by HERNAN Crwaford on 7/26/2023 at 11:41 AM

## 2023-07-26 NOTE — PLAN OF CARE
Problem: Discharge Planning  Goal: Discharge to home or other facility with appropriate resources  7/26/2023 0557 by Marielos Lei RN  Outcome: Progressing     Problem: Safety - Adult  Goal: Free from fall injury  7/26/2023 0557 by Marielos Lei RN  Outcome: Progressing     Problem: Skin/Tissue Integrity  Goal: Absence of new skin breakdown  Description: 1. Monitor for areas of redness and/or skin breakdown  2. Assess vascular access sites hourly  3. Every 4-6 hours minimum:  Change oxygen saturation probe site  4. Every 4-6 hours:  If on nasal continuous positive airway pressure, respiratory therapy assess nares and determine need for appliance change or resting period.   7/26/2023 0557 by Marielos Lei RN  Outcome: Progressing     Problem: Neurosensory - Adult  Goal: Achieves stable or improved neurological status  Outcome: Progressing     Problem: Neurosensory - Adult  Goal: Achieves maximal functionality and self care  Outcome: Progressing

## 2023-07-26 NOTE — PROGRESS NOTES
Clinical Pharmacy Note  Medication Counseling    Reviewed new medications started during hospital admission: asa, [id number] [address] . Indications and side effects were emphasized during counseling. All medication-related questions addressed. Patient verbalized understanding of education. Should the patient express any additional questions or concerns regarding their medications, please do not hesitate to contact the pharmacy department. Patient/caregiver aware they may refuse medications during hospital stay. 10 minutes spent educating patient regarding medications.

## 2023-07-27 LAB
ANION GAP SERPL CALCULATED.3IONS-SCNC: 10 MMOL/L (ref 3–16)
BUN SERPL-MCNC: 15 MG/DL (ref 7–20)
CALCIUM SERPL-MCNC: 10.7 MG/DL (ref 8.3–10.6)
CHLORIDE SERPL-SCNC: 105 MMOL/L (ref 99–110)
CO2 SERPL-SCNC: 21 MMOL/L (ref 21–32)
CREAT SERPL-MCNC: 0.9 MG/DL (ref 0.6–1.2)
DEPRECATED RDW RBC AUTO: 15.5 % (ref 12.4–15.4)
GFR SERPLBLD CREATININE-BSD FMLA CKD-EPI: >60 ML/MIN/{1.73_M2}
GLUCOSE SERPL-MCNC: 131 MG/DL (ref 70–99)
HCT VFR BLD AUTO: 37.1 % (ref 36–48)
HGB BLD-MCNC: 11.9 G/DL (ref 12–16)
LV EF: 70 %
LVEF MODALITY: NORMAL
MCH RBC QN AUTO: 24.5 PG (ref 26–34)
MCHC RBC AUTO-ENTMCNC: 31.9 G/DL (ref 31–36)
MCV RBC AUTO: 76.8 FL (ref 80–100)
PLATELET # BLD AUTO: 327 K/UL (ref 135–450)
PMV BLD AUTO: 8.3 FL (ref 5–10.5)
POTASSIUM SERPL-SCNC: 4.1 MMOL/L (ref 3.5–5.1)
RBC # BLD AUTO: 4.84 M/UL (ref 4–5.2)
SODIUM SERPL-SCNC: 136 MMOL/L (ref 136–145)
WBC # BLD AUTO: 6.4 K/UL (ref 4–11)

## 2023-07-27 PROCEDURE — 2060000000 HC ICU INTERMEDIATE R&B

## 2023-07-27 PROCEDURE — 6370000000 HC RX 637 (ALT 250 FOR IP): Performed by: INTERNAL MEDICINE

## 2023-07-27 PROCEDURE — 36415 COLL VENOUS BLD VENIPUNCTURE: CPT

## 2023-07-27 PROCEDURE — 6360000002 HC RX W HCPCS: Performed by: INTERNAL MEDICINE

## 2023-07-27 PROCEDURE — 97130 THER IVNTJ EA ADDL 15 MIN: CPT

## 2023-07-27 PROCEDURE — 94760 N-INVAS EAR/PLS OXIMETRY 1: CPT

## 2023-07-27 PROCEDURE — 97530 THERAPEUTIC ACTIVITIES: CPT

## 2023-07-27 PROCEDURE — 97129 THER IVNTJ 1ST 15 MIN: CPT

## 2023-07-27 PROCEDURE — 80048 BASIC METABOLIC PNL TOTAL CA: CPT

## 2023-07-27 PROCEDURE — 97535 SELF CARE MNGMENT TRAINING: CPT

## 2023-07-27 PROCEDURE — 85027 COMPLETE CBC AUTOMATED: CPT

## 2023-07-27 PROCEDURE — C8924 2D TTE W OR W/O FOL W/CON,FU: HCPCS

## 2023-07-27 RX ORDER — CHLORTHALIDONE 25 MG/1
25 TABLET ORAL DAILY
Status: DISCONTINUED | OUTPATIENT
Start: 2023-07-27 | End: 2023-07-28 | Stop reason: HOSPADM

## 2023-07-27 RX ORDER — ATORVASTATIN CALCIUM 80 MG/1
80 TABLET, FILM COATED ORAL NIGHTLY
Qty: 30 TABLET | Refills: 3 | Status: ON HOLD | OUTPATIENT
Start: 2023-07-27 | End: 2023-08-03 | Stop reason: SDUPTHER

## 2023-07-27 RX ORDER — CLOPIDOGREL BISULFATE 75 MG/1
75 TABLET ORAL DAILY
Qty: 21 TABLET | Refills: 0 | Status: ON HOLD | OUTPATIENT
Start: 2023-07-27 | End: 2023-08-03 | Stop reason: SDUPTHER

## 2023-07-27 RX ORDER — ASPIRIN 81 MG/1
81 TABLET ORAL DAILY
Qty: 30 TABLET | Refills: 3 | Status: ON HOLD | OUTPATIENT
Start: 2023-07-28 | End: 2023-08-03 | Stop reason: SDUPTHER

## 2023-07-27 RX ORDER — CLOPIDOGREL BISULFATE 75 MG/1
75 TABLET ORAL DAILY
Status: DISCONTINUED | OUTPATIENT
Start: 2023-07-27 | End: 2023-07-28 | Stop reason: HOSPADM

## 2023-07-27 RX ADMIN — LOSARTAN POTASSIUM 50 MG: 50 TABLET, FILM COATED ORAL at 08:19

## 2023-07-27 RX ADMIN — CHLORTHALIDONE 25 MG: 25 TABLET ORAL at 11:32

## 2023-07-27 RX ADMIN — ENOXAPARIN SODIUM 40 MG: 100 INJECTION SUBCUTANEOUS at 20:57

## 2023-07-27 RX ADMIN — ATORVASTATIN CALCIUM 80 MG: 80 TABLET, FILM COATED ORAL at 20:57

## 2023-07-27 RX ADMIN — ASPIRIN 81 MG: 81 TABLET, COATED ORAL at 08:19

## 2023-07-27 RX ADMIN — CLOPIDOGREL BISULFATE 75 MG: 75 TABLET ORAL at 11:32

## 2023-07-27 NOTE — PROGRESS NOTES
Speech Language/Pathology   SPEECH LANGUAGE AND CLINICAL BEDSIDE SWALLOWING TREATMENT  Speech Therapy Department       Kevin Jason  AGE: 70 y.o. GENDER: female  : 1955  [id number]  EPISODE DATE:  2023    MEDICAL DIAGNOSIS:   Chief Complaint   Patient presents with    Other     States \" I don't feel right, I can't describe it. \" States she noticed it started around 0400 this AM. Reports having knee surgery on . Brought today ED by PT. States last felt normal for sure 2 days ago. States she slept all day yesterday. Reports right arm and leg feels weaker than normal. Minor drift to right arm, some effort against gravity to right leg. Patient alert and oriented. ONSET: 2023       PAST MEDICAL HISTORY      Diagnosis Date    Hepatitis     Hypertension      PAST SURGICAL HISTORY  Past Surgical History:   Procedure Laterality Date    HYSTERECTOMY (CERVIX STATUS UNKNOWN)      KNEE SURGERY Right     Right knee replacement    TUMOR EXCISION       ALLERGIES  Allergies   Allergen Reactions    Erythromycin     Nsaids      Chart Review:   H&P:   The patient is a 70 y.o. female who presents to [facility] with weakness. Pt states she woke up this am not feeling well. She is unable to describe, just didn't feel weel and hence presented to the ER. CT head showed acute cva. Admitted for further work-up. Imaging:  Chest X-ray: 2023  IMPRESSION:  No radiographic evidence of acute pulmonary disease. Head CT: 2023  IMPRESSION:  Small focal area of abnormal low-attenuation in posterior left occipital  lobe. Findings suspicious representing subtle area of recent ischemic change  as described above. Follow-up MRI examination with diffusion imaging may be  helpful for more complete evaluation. Brain MRI: 2023  IMPRESSION:  Acute to subacute small cortical infarcts throughout the left parietal and  occipital lobes.   Additional small scattered acute to subacute

## 2023-07-27 NOTE — PROGRESS NOTES
ARU consult noted. Dr. Dalila Cheng full consult filed. Accepted for inpatient rehab when medically stable pending insurance authorization. Authorization submitted through [employer] #[id number]. Will monitor for changes and update CM as needed.

## 2023-07-27 NOTE — CONSULTS
Consult Note  Physical Medicine and Rehabilitation    Patient: Sushma Leal  [id number]  Date: 7/27/2023      Chief Complaint: right side weakness    History of Present Illness/Hospital Course:  Patient is a 69 yo F with pmh HTN, hepatitis, and recent right total knee arthroplasty (6/2023) who initially presented 7/25/2023 with right side weakness. CT head negative for hemorrhage but showed possible recent ischemic change in the posterior left occipital lobe. CTA head/neck showed moderate focal narrowing of the right supraclinoid ICA. MRI brain showed acute to subacute small cortical infarcts throughout the left parietal and occipital lobes and bilateral frontal lobes. Neurology has been consulted. Concern for cardioembolic etiology. Recommending DAPT x 21 days, statin, event monitor. Course complicated by HTN, hyperglycemia, hypercalcemia. Currently, patient reports ongoing but improving right side weakness and difficulty with coordination of the right hand. She has some abnormal sensation/numbness in the distal RLE. She has noted short term memory difficulty. Also with right side vision impairment. She denies headache.  She is potentially interested in coming to ARU to improve her function prior to returning home, however would like to discuss plans with her daughters first.       Prior Level of Function:  Independent for mobility, ADLs, and IADLs    Current Level of Function:  Nel for mobility and ADLs    Pertinent Social History:  Support: lives alone, potential assist available from daughters  Home set-up: 1 level with laundry n basement, 4 steps to enter    Past Medical History:   Diagnosis Date    Hepatitis     Hypertension        Past Surgical History:   Procedure Laterality Date    HYSTERECTOMY (CERVIX STATUS UNKNOWN)      KNEE SURGERY Right 2023    Right knee replacement    TUMOR EXCISION         Family History   Problem Relation Age of Onset    Hypertension Mother     Cancer Mother     Hypertension 07/27/2023    K 4.1 07/27/2023     07/27/2023    CO2 21 07/27/2023     Lab Results   Component Value Date    ALT 8 (L) 07/25/2023    AST 15 07/25/2023    ALKPHOS 95 07/25/2023    BILITOT 0.3 07/25/2023       Most recent echocardiogram revealed:  Left ventricular cavity size is normal.   There is mild asymmetric hypertrophy of the septum. Overall left ventricular   systolic function appears hyperdynamic. .   EF >70%. Normal diastolic function. The left atrial size is normal.   The right ventricle is normal in size and function. There is mild tricuspid valve regurgitation    Most recent EKG revealed:  NSR on telemetry    Most recent imaging studies revealed:  MRI brain  Acute to subacute small cortical infarcts throughout the left parietal and  occipital lobes. Additional small scattered acute to subacute infarcts  throughout the bilateral frontal lobes with involvement of the deep white  matter. No acute hemorrhage or significant mass effect. CTA head/neck  Moderate focal narrowing of the right supraclinoid ICA. Otherwise, no flow limiting stenosis or occlusion within the head or neck. CT head  Small focal area of abnormal low-attenuation in posterior left occipital  lobe. Findings suspicious representing subtle area of recent ischemic change  as described above. Follow-up MRI examination with diffusion imaging may be  helpful for more complete evaluation. On my review, CXR displays no consolidations or effusions.      Assessment:  Acute ischemic stroke -Multifocal, multiple vascular territories (left parietal, left occipital, bilateral frontal lobes)  HTN, uncontrolled  HLD  DM2 with hyperglycemia (Hgb A1C 6.8%)  Hypercalcemia  Recent right total knee arthroplasty (6/7/2023 with Dr. Linsey Hughes) -contributing to functional impairments  Mild OP dysphagia    Impairments: right hemiparesis, right sensory deficit, right VF cut (RLQ homonymous quadrantanopsia), decreased balance, decreased R knee ROM,

## 2023-07-27 NOTE — PROGRESS NOTES
NEUROLOGY PROGRESS NOTE  Reason for Consult: Dr Bonita Cowart MD asked me to see Rickey Rosario in consultation for evaluation of acute CVA. Chief complaint: \"I just felt weird. \"    UPDATE 7/27/23:  Patient states she is feeling good. Still has numbness in her right foot. Stroke Summary:  -Presentation: Right side weakness  -MRI showed multiple small infarcts c/w cardioembolic etiology  -Vessel imaging: Moderate R ICA focal narrowing  -Echo:  EF normal, needs bubble study.   -Telemetry:  NSR  -Secondary prevention PTA:  None  -Residual deficits:  RLQ homonymous hemianopsia, R foot numbness. -Vascular risk factors:  Age 70, smoker, family hx, HTN    MEDICAL HISTORY:  Past Medical History:   Diagnosis Date    Hepatitis     Hypertension      Past Surgical History:   Procedure Laterality Date    HYSTERECTOMY (CERVIX STATUS UNKNOWN)      KNEE SURGERY Right 2023    Right knee replacement    TUMOR EXCISION       Scheduled Meds:   losartan  50 mg Oral Daily    sodium chloride  1,000 mL IntraVENous Once    enoxaparin  40 mg SubCUTAneous Nightly    aspirin  81 mg Oral Daily    Or    aspirin  300 mg Rectal Daily    atorvastatin  80 mg Oral Nightly     Continuous Infusions:      PRN Meds:.iomeprol, ondansetron **OR** ondansetron, polyethylene glycol, perflutren lipid microspheres    Medications Prior to Admission: abacavir-dolutegravir-lamivudine (TRIUMEQ) 600- MG TABS, Take by mouth daily  chlorthalidone (HYGROTON) 25 MG tablet, Take 1 tablet by mouth daily  gabapentin (NEURONTIN) 100 MG capsule, Take 1 capsule by mouth 2 times daily. losartan (COZAAR) 50 MG tablet, Take 1 tablet by mouth daily  loratadine (CLARITIN) 10 MG capsule, Take  by mouth. [DISCONTINUED] hydrochlorothiazide (HYDRODIURIL) 25 MG tablet, Take 25 mg by mouth daily.  (Patient not taking: Reported on 7/25/2023)    Allergies   Allergen Reactions    Erythromycin     Nsaids        Family History   Problem Relation Age of Onset

## 2023-07-27 NOTE — PROGRESS NOTES
Patient daughter at bedside. Requesting IF patient is to be discharged to the ARU, please call her Arnulfo Moraes (number in chart) for update and room number.      Electronically signed by Mely Hancock RN on 7/27/2023 at 6:15 PM

## 2023-07-27 NOTE — PLAN OF CARE
Problem: Discharge Planning  Goal: Discharge to home or other facility with appropriate resources  7/27/2023 0833 by Josie Hernandez RN  Outcome: Progressing     Problem: Safety - Adult  Goal: Free from fall injury  7/27/2023 0833 by Josie Hernandez RN  Outcome: Progressing     Problem: Skin/Tissue Integrity  Goal: Absence of new skin breakdown  Description: 1. Monitor for areas of redness and/or skin breakdown  2. Assess vascular access sites hourly  3. Every 4-6 hours minimum:  Change oxygen saturation probe site  4. Every 4-6 hours:  If on nasal continuous positive airway pressure, respiratory therapy assess nares and determine need for appliance change or resting period.   7/27/2023 8382 by Josie Hernandez RN  Outcome: Progressing     Problem: Neurosensory - Adult  Goal: Achieves stable or improved neurological status  7/27/2023 0833 by Jsoie Hernandez RN  Outcome: Progressing     Problem: Neurosensory - Adult  Goal: Achieves maximal functionality and self care  7/27/2023 0833 by Josie Hernandez RN  Outcome: Progressing     Problem: Chronic Conditions and Co-morbidities  Goal: Patient's chronic conditions and co-morbidity symptoms are monitored and maintained or improved  7/27/2023 0833 by Josie Hernandez RN  Outcome: Progressing

## 2023-07-27 NOTE — PLAN OF CARE
Problem: Discharge Planning  Goal: Discharge to home or other facility with appropriate resources  7/26/2023 2231 by Ambar Hernandez RN  Outcome: Progressing     Problem: Safety - Adult  Goal: Free from fall injury  7/26/2023 2231 by Ambar Hernandez RN  Outcome: Progressing     Problem: Skin/Tissue Integrity  Goal: Absence of new skin breakdown  Description: 1. Monitor for areas of redness and/or skin breakdown  2. Assess vascular access sites hourly  3. Every 4-6 hours minimum:  Change oxygen saturation probe site  4. Every 4-6 hours:  If on nasal continuous positive airway pressure, respiratory therapy assess nares and determine need for appliance change or resting period.   7/26/2023 2231 by Ambar Hernandez RN  Outcome: Progressing     Problem: Neurosensory - Adult  Goal: Achieves stable or improved neurological status  7/26/2023 2231 by Ambar Hernandez RN  Outcome: Progressing     Problem: Neurosensory - Adult  Goal: Achieves maximal functionality and self care  7/26/2023 2231 by Ambar Hernandez RN  Outcome: Progressing     Problem: Chronic Conditions and Co-morbidities  Goal: Patient's chronic conditions and co-morbidity symptoms are monitored and maintained or improved  7/26/2023 2231 by Ambar Hernandez RN  Outcome: Progressing

## 2023-07-27 NOTE — CARE COORDINATION
Spoke with Ruthie Auguste from [facility], Can accept when medically stable. Ruthie Auguste will complete precert for insurance. Following up with Pt daughters to update.      Electronically signed by HERNAN Glez on 7/27/2023 at 2:25 PM

## 2023-07-27 NOTE — PROGRESS NOTES
Patient agreeable to going to ARU.      Electronically signed by Thiago Queen RN on 7/27/2023 at 6:48 PM

## 2023-07-28 ENCOUNTER — APPOINTMENT (OUTPATIENT)
Dept: PHYSICAL THERAPY | Age: 68
End: 2023-07-28
Payer: MEDICARE

## 2023-07-28 ENCOUNTER — HOSPITAL ENCOUNTER (INPATIENT)
Age: 68
DRG: 057 | End: 2023-07-28
Attending: PHYSICAL MEDICINE & REHABILITATION | Admitting: PHYSICAL MEDICINE & REHABILITATION
Payer: MEDICARE

## 2023-07-28 VITALS
SYSTOLIC BLOOD PRESSURE: 126 MMHG | TEMPERATURE: 98.9 F | HEART RATE: 79 BPM | RESPIRATION RATE: 23 BRPM | DIASTOLIC BLOOD PRESSURE: 82 MMHG | BODY MASS INDEX: 30.18 KG/M2 | HEIGHT: 62 IN | OXYGEN SATURATION: 98 % | WEIGHT: 164.02 LBS

## 2023-07-28 PROBLEM — I63.9 ACUTE ISCHEMIC STROKE (HCC): Status: ACTIVE | Noted: 2023-07-28

## 2023-07-28 LAB
ANION GAP SERPL CALCULATED.3IONS-SCNC: 12 MMOL/L (ref 3–16)
BUN SERPL-MCNC: 14 MG/DL (ref 7–20)
CALCIUM SERPL-MCNC: 11 MG/DL (ref 8.3–10.6)
CHLORIDE SERPL-SCNC: 103 MMOL/L (ref 99–110)
CO2 SERPL-SCNC: 20 MMOL/L (ref 21–32)
CREAT SERPL-MCNC: 0.9 MG/DL (ref 0.6–1.2)
DEPRECATED RDW RBC AUTO: 15.6 % (ref 12.4–15.4)
GFR SERPLBLD CREATININE-BSD FMLA CKD-EPI: >60 ML/MIN/{1.73_M2}
GLUCOSE SERPL-MCNC: 126 MG/DL (ref 70–99)
HCT VFR BLD AUTO: 38.1 % (ref 36–48)
HGB BLD-MCNC: 12.2 G/DL (ref 12–16)
MCH RBC QN AUTO: 25 PG (ref 26–34)
MCHC RBC AUTO-ENTMCNC: 32 G/DL (ref 31–36)
MCV RBC AUTO: 77.9 FL (ref 80–100)
PLATELET # BLD AUTO: 339 K/UL (ref 135–450)
PMV BLD AUTO: 7.8 FL (ref 5–10.5)
POTASSIUM SERPL-SCNC: 4.1 MMOL/L (ref 3.5–5.1)
RBC # BLD AUTO: 4.89 M/UL (ref 4–5.2)
SODIUM SERPL-SCNC: 135 MMOL/L (ref 136–145)
WBC # BLD AUTO: 7 K/UL (ref 4–11)

## 2023-07-28 PROCEDURE — 1280000000 HC REHAB R&B

## 2023-07-28 PROCEDURE — 6370000000 HC RX 637 (ALT 250 FOR IP): Performed by: PHYSICAL MEDICINE & REHABILITATION

## 2023-07-28 PROCEDURE — 6370000000 HC RX 637 (ALT 250 FOR IP): Performed by: INTERNAL MEDICINE

## 2023-07-28 PROCEDURE — 85027 COMPLETE CBC AUTOMATED: CPT

## 2023-07-28 PROCEDURE — 92523 SPEECH SOUND LANG COMPREHEN: CPT

## 2023-07-28 PROCEDURE — 97530 THERAPEUTIC ACTIVITIES: CPT

## 2023-07-28 PROCEDURE — 36415 COLL VENOUS BLD VENIPUNCTURE: CPT

## 2023-07-28 PROCEDURE — 97162 PT EVAL MOD COMPLEX 30 MIN: CPT

## 2023-07-28 PROCEDURE — 97535 SELF CARE MNGMENT TRAINING: CPT

## 2023-07-28 PROCEDURE — 80048 BASIC METABOLIC PNL TOTAL CA: CPT

## 2023-07-28 PROCEDURE — 97166 OT EVAL MOD COMPLEX 45 MIN: CPT

## 2023-07-28 PROCEDURE — 97116 GAIT TRAINING THERAPY: CPT

## 2023-07-28 RX ORDER — BISACODYL 10 MG
10 SUPPOSITORY, RECTAL RECTAL DAILY PRN
Status: DISCONTINUED | OUTPATIENT
Start: 2023-07-28 | End: 2023-08-05 | Stop reason: HOSPADM

## 2023-07-28 RX ORDER — CLOPIDOGREL BISULFATE 75 MG/1
75 TABLET ORAL DAILY
Status: CANCELLED | OUTPATIENT
Start: 2023-07-29 | End: 2023-08-19

## 2023-07-28 RX ORDER — LOSARTAN POTASSIUM 50 MG/1
50 TABLET ORAL DAILY
Status: DISCONTINUED | OUTPATIENT
Start: 2023-07-29 | End: 2023-08-05 | Stop reason: HOSPADM

## 2023-07-28 RX ORDER — ONDANSETRON 4 MG/1
4 TABLET, ORALLY DISINTEGRATING ORAL EVERY 8 HOURS PRN
Status: CANCELLED | OUTPATIENT
Start: 2023-07-28

## 2023-07-28 RX ORDER — ONDANSETRON 2 MG/ML
4 INJECTION INTRAMUSCULAR; INTRAVENOUS EVERY 6 HOURS PRN
Status: DISCONTINUED | OUTPATIENT
Start: 2023-07-28 | End: 2023-08-05 | Stop reason: HOSPADM

## 2023-07-28 RX ORDER — HYDRALAZINE HYDROCHLORIDE 10 MG/1
10 TABLET, FILM COATED ORAL EVERY 6 HOURS PRN
Status: CANCELLED | OUTPATIENT
Start: 2023-07-28

## 2023-07-28 RX ORDER — CHLORTHALIDONE 25 MG/1
25 TABLET ORAL DAILY
Status: DISCONTINUED | OUTPATIENT
Start: 2023-07-29 | End: 2023-08-05 | Stop reason: HOSPADM

## 2023-07-28 RX ORDER — ASPIRIN 81 MG/1
81 TABLET ORAL DAILY
Status: DISCONTINUED | OUTPATIENT
Start: 2023-07-29 | End: 2023-08-05 | Stop reason: HOSPADM

## 2023-07-28 RX ORDER — BISACODYL 10 MG
10 SUPPOSITORY, RECTAL RECTAL DAILY PRN
Status: CANCELLED | OUTPATIENT
Start: 2023-07-28

## 2023-07-28 RX ORDER — ACETAMINOPHEN 325 MG/1
650 TABLET ORAL EVERY 6 HOURS PRN
Status: CANCELLED | OUTPATIENT
Start: 2023-07-28

## 2023-07-28 RX ORDER — ASPIRIN 300 MG/1
300 SUPPOSITORY RECTAL DAILY
Status: DISCONTINUED | OUTPATIENT
Start: 2023-07-29 | End: 2023-08-05 | Stop reason: HOSPADM

## 2023-07-28 RX ORDER — ONDANSETRON 2 MG/ML
4 INJECTION INTRAMUSCULAR; INTRAVENOUS EVERY 6 HOURS PRN
Status: CANCELLED | OUTPATIENT
Start: 2023-07-28

## 2023-07-28 RX ORDER — ENOXAPARIN SODIUM 100 MG/ML
40 INJECTION SUBCUTANEOUS DAILY
Status: CANCELLED | OUTPATIENT
Start: 2023-07-29

## 2023-07-28 RX ORDER — LANOLIN ALCOHOL/MO/W.PET/CERES
3 CREAM (GRAM) TOPICAL NIGHTLY PRN
Status: CANCELLED | OUTPATIENT
Start: 2023-07-28

## 2023-07-28 RX ORDER — POLYETHYLENE GLYCOL 3350 17 G/17G
17 POWDER, FOR SOLUTION ORAL DAILY PRN
Status: CANCELLED | OUTPATIENT
Start: 2023-07-28

## 2023-07-28 RX ORDER — ONDANSETRON 4 MG/1
4 TABLET, ORALLY DISINTEGRATING ORAL EVERY 8 HOURS PRN
Status: DISCONTINUED | OUTPATIENT
Start: 2023-07-28 | End: 2023-08-05 | Stop reason: HOSPADM

## 2023-07-28 RX ORDER — ATORVASTATIN CALCIUM 80 MG/1
80 TABLET, FILM COATED ORAL NIGHTLY
Status: CANCELLED | OUTPATIENT
Start: 2023-07-28

## 2023-07-28 RX ORDER — ASPIRIN 300 MG/1
300 SUPPOSITORY RECTAL DAILY
Status: CANCELLED | OUTPATIENT
Start: 2023-07-29

## 2023-07-28 RX ORDER — ENOXAPARIN SODIUM 100 MG/ML
40 INJECTION SUBCUTANEOUS DAILY
Status: DISCONTINUED | OUTPATIENT
Start: 2023-07-29 | End: 2023-08-05 | Stop reason: HOSPADM

## 2023-07-28 RX ORDER — ATORVASTATIN CALCIUM 80 MG/1
80 TABLET, FILM COATED ORAL NIGHTLY
Status: DISCONTINUED | OUTPATIENT
Start: 2023-07-28 | End: 2023-08-05 | Stop reason: HOSPADM

## 2023-07-28 RX ORDER — LOSARTAN POTASSIUM 50 MG/1
50 TABLET ORAL DAILY
Status: CANCELLED | OUTPATIENT
Start: 2023-07-29

## 2023-07-28 RX ORDER — LANOLIN ALCOHOL/MO/W.PET/CERES
3 CREAM (GRAM) TOPICAL NIGHTLY PRN
Status: DISCONTINUED | OUTPATIENT
Start: 2023-07-28 | End: 2023-08-05 | Stop reason: HOSPADM

## 2023-07-28 RX ORDER — POLYETHYLENE GLYCOL 3350 17 G/17G
17 POWDER, FOR SOLUTION ORAL DAILY PRN
Status: DISCONTINUED | OUTPATIENT
Start: 2023-07-28 | End: 2023-08-05 | Stop reason: HOSPADM

## 2023-07-28 RX ORDER — ASPIRIN 81 MG/1
81 TABLET ORAL DAILY
Status: CANCELLED | OUTPATIENT
Start: 2023-07-29

## 2023-07-28 RX ORDER — CLOPIDOGREL BISULFATE 75 MG/1
75 TABLET ORAL DAILY
Status: DISCONTINUED | OUTPATIENT
Start: 2023-07-29 | End: 2023-08-05 | Stop reason: HOSPADM

## 2023-07-28 RX ORDER — CHLORTHALIDONE 25 MG/1
25 TABLET ORAL DAILY
Status: CANCELLED | OUTPATIENT
Start: 2023-07-29

## 2023-07-28 RX ORDER — ACETAMINOPHEN 325 MG/1
650 TABLET ORAL EVERY 6 HOURS PRN
Status: DISCONTINUED | OUTPATIENT
Start: 2023-07-28 | End: 2023-08-05 | Stop reason: HOSPADM

## 2023-07-28 RX ORDER — HYDRALAZINE HYDROCHLORIDE 10 MG/1
10 TABLET, FILM COATED ORAL EVERY 6 HOURS PRN
Status: DISCONTINUED | OUTPATIENT
Start: 2023-07-28 | End: 2023-08-05 | Stop reason: HOSPADM

## 2023-07-28 RX ADMIN — ASPIRIN 81 MG: 81 TABLET, COATED ORAL at 08:29

## 2023-07-28 RX ADMIN — CHLORTHALIDONE 25 MG: 25 TABLET ORAL at 08:30

## 2023-07-28 RX ADMIN — LOSARTAN POTASSIUM 50 MG: 50 TABLET, FILM COATED ORAL at 08:29

## 2023-07-28 RX ADMIN — CLOPIDOGREL BISULFATE 75 MG: 75 TABLET ORAL at 08:30

## 2023-07-28 RX ADMIN — Medication 3 MG: at 19:51

## 2023-07-28 RX ADMIN — ATORVASTATIN CALCIUM 80 MG: 80 TABLET, FILM COATED ORAL at 19:51

## 2023-07-28 NOTE — PROGRESS NOTES
CLINICAL PHARMACY NOTE: MEDS TO BEDS    Retail pharmacy has been notified that the patient is either going to a SNF, ARU, or other inpatient facility.      All prescriptions sent to the retail pharmacy for this patient will be kept on profile unless told otherwise.    07/28/23 10:00 AM

## 2023-07-28 NOTE — CONSULTS
Comprehensive Nutrition Assessment    Type and Reason for Visit:  Consult (ARU admit)    Nutrition Recommendations/Plan:   Continue 4 carb diet. Monitor meal intakes. Monitor weight trends. Honor food/fluid preferences as possible. Malnutrition Assessment:  Malnutrition Status:  Mild malnutrition (07/28/23 1404)    Context:  Acute Illness     Findings of the 6 clinical characteristics of malnutrition:  Energy Intake:  No significant decrease in energy intake  Weight Loss:  Greater than 2% over 1 week     Body Fat Loss:  No significant body fat loss     Muscle Mass Loss:  No significant muscle mass loss    Fluid Accumulation:  No significant fluid accumulation     Strength:  Not Performed    Nutrition Assessment:    Consult for ARU. Pt. addmitted for acute ischemic stroke. PMH includes cataract of both eyes, acute CVA (7/2023). She presents with Rt. side weakness and is addressing functional deficits. Per MD documentation deficits include \"right hemiparesis, right sensory deficit, right VF cut (RLQ homonymous quadrantanopsia), decreased balance, decreased R knee ROM, dysarthria, dysphagia, cognition. \" Continuing on 4 carb diet. Meal intakes are adequate and likely meet EER. Per EMR pt. appears weight stable. Nutrition Related Findings:    Labs reviewed. Meds reviewed. Skin intact. Last BM 7/27. Wound Type: None       Current Nutrition Intake & Therapies:    Average Meal Intake: 51-75%, %  Average Supplements Intake: None Ordered  ADULT DIET; Regular; 4 carb choices (60 gm/meal)    Anthropometric Measures:  Height: 5' 1\" (154.9 cm)  Ideal Body Weight (IBW): 105 lbs (48 kg)       Current Body Weight: 164 lb 3.9 oz (74.5 kg), 156.4 % IBW.  Weight Source: Bed Scale  Current BMI (kg/m2): 31                               Estimated Daily Nutrient Needs:  Energy Requirements Based On: Kcal/kg  Weight Used for Energy Requirements: Current  Energy (kcal/day): 1500-1875kcal (20-25kcal/kg CBW)  Weight Used for Protein Requirements: Current  Protein (g/day): 90-113g (1.2-1.5g/kg CBW)  Method Used for Fluid Requirements: 1 ml/kcal  Fluid (ml/day): Or per provider.     Nutrition Diagnosis:   Increased nutrient needs related to increase demand for energy/nutrients as evidenced by other (comment) (Acute ischemic stroke.)  Overweight/Obese related to excessive energy intake as evidenced by BMI    Nutrition Interventions:   Food and/or Nutrient Delivery: Continue Current Diet  Nutrition Education/Counseling: Education not indicated  Coordination of Nutrition Care: Continue to monitor while inpatient       Goals:  Previous Goal Met: Progressing toward Goal(s)  Goals: Meet at least 75% of estimated needs, by next RD assessment       Nutrition Monitoring and Evaluation:   Behavioral-Environmental Outcomes: None Identified  Food/Nutrient Intake Outcomes: Food and Nutrient Intake       Discharge Planning:    No discharge needs at this time     Jenifer Andrade, [address]: [id number]

## 2023-07-28 NOTE — PROGRESS NOTES
ADMISSION ORIENTATION    [address] [place] [address] RECORD NUMBER:  [id number]  AGE: 70 y.o. GENDER: female  : 1955  TODAYS DATE:  2023      Room Orientation     Patient admitted to room 3257 per [x] Wheel Chair  [] Bed  [] Recliner  [] Stretcher  [] Other: . Transferred to:  [x] Bed  [] Recliner  [] Other: .      Patient Required contact guard with Walker    Patient was oriented to:  [x] Call Light  [x] Room Phone  [x] TV  [x] Thermostat  [x] Bathroom  [x] Bed and Chair Alarms per Rehab Policy  [] Closet  [x] Ash Soup and it's Use on Rehab  [] Other:    Patient Verbalized Understanding: Yes  Family Present: No      Rehab Orientation     [x] 3 Hours of Therapy  through   [x] Focus and Specialty of Physical, Occupational, and Speech and Language Pathology  [x] Weekly Team Conference and Discharge Planning  [x] Roles of the Rehab Doctor, Consulting Doctors, Nursing, Dietary, and Social Work  [x] Everyday Clothing, including proper footwear, for Therapy  [x] Visiting Hours, Visitor Ages, and Visitors Sleeping Overnight in the Hospital  [x] Visitor Participation in Therapy  [x]  and Sundays on Rehab  [x] Introduction of Welcome Folder, including Rehab Expectations, Nurse Manager's Welcome Letter, Visitor's Guide, and Menu Service  [x] Planning Ahead and Ordering Meals  [x] Falls Contract [] Signed, [] Copied, and [] Taped to [facility]  [] Other:    Patient Verbalized Understanding: Yes  Family Present: No      Electronically signed by Anabelle Melendez RN on 2023 at 12:37 PM 32

## 2023-07-28 NOTE — H&P
Department of Physical Medicine & Rehabilitation  History & Physical      Patient Identification:  Emerson Fontana  : 1955  Admit date: 2023   Attending provider: Andi Partida MD        Primary care provider: Wadell Lombard     Chief Complaint: right side weakness     History of Present Illness/Hospital Course:  Patient is a 71 yo F with pmh HTN, hepatitis, and recent right total knee arthroplasty (2023) who initially presented 2023 with right side weakness. CT head negative for hemorrhage but showed possible recent ischemic change in the posterior left occipital lobe. CTA head/neck showed moderate focal narrowing of the right supraclinoid ICA. MRI brain showed acute to subacute small cortical infarcts throughout the left parietal and occipital lobes and bilateral frontal lobes. Neurology has been consulted. Concern for cardioembolic etiology. Recommending DAPT x 21 days, statin, event monitor. Course complicated by HTN, hyperglycemia, hypercalcemia. Now presents to ARU with impaired mobility, self-care, and cognition below her baseline. Currently, patient reports ongoing but improving right side weakness and difficulty with coordination of the right hand. She has some abnormal sensation/numbness in the distal RLE. She has noted short term memory difficulty. Also with right side vision impairment. She denies headache.  She is motivated to start inpatient rehabilitation program.       Prior Level of Function:  Independent for mobility, ADLs, and IADLs     Current Level of Function:  Nel for mobility and ADLs     Pertinent Social History:  Support: lives alone, potential assist available from daughters  Home set-up: 1 level with laundry n basement, 4 steps to enter    Past Medical History:   Diagnosis Date    Hepatitis     Hypertension        Past Surgical History:   Procedure Laterality Date    HYSTERECTOMY (CERVIX STATUS UNKNOWN)      KNEE SURGERY Right     Right knee replacement days (then ASA monotherapy), statin  -PT/OT/SLP    HTN, uncontrolled  -continue chlorthalidone (newly added), losartan -- monitor trend    HLD  -continue atorvastatin    DM2 with hyperglycemia (Hgb A1C 6.8%)  -Diet education  -monitor blood sugars intermittently to determine if metformin is indicated    Hypercalcemia  -Check PTH    Recent right total knee arthroplasty (6/7/2023 with Dr. Tao Salazar)   -contributing to functional impairments  -PT/OT    Mild OP dysphagia  -Dietitian and SLP consults.   -Currently on regular + thins    Bladder   -High risk retention   -Monitor PVRs, SC prn >300cc    Bowel   -High risk constipation   -PRN miralax, MoM, and bisacodyl supp. Safety   -fall and aspiration precautions    Pain control  -acetaminophen prn    DVT ppx  -lovenox    FULL CODE    Sloane Ruano MD 7/28/2023, 5:29 PM

## 2023-07-28 NOTE — PROGRESS NOTES
Physical Therapy  Facility/Department: [facility]  Rehabilitation Physical Therapy Initial Assessment    NAME: Lesa Richmond  : 1955 (70 y.o.)  MRN: [id number]  CODE STATUS: Full Code    Date of Service: 23      Past Medical History:   Diagnosis Date    Hepatitis     Hypertension      Past Surgical History:   Procedure Laterality Date    HYSTERECTOMY (CERVIX STATUS UNKNOWN)      KNEE SURGERY Right     Right knee replacement    TUMOR EXCISION         Chart Reviewed: Yes  Additional Pertinent Hx: Per Dr. Jacinda Thorpe consult note, \"76 yo F with pmh HTN, hepatitis, and recent right total knee arthroplasty (2023) who initially presented 2023 with right side weakness. CT head negative for hemorrhage but showed possible recent ischemic change in the posterior left occipital lobe. CTA head/neck showed moderate focal narrowing of the right supraclinoid ICA. MRI brain showed acute to subacute small cortical infarcts throughout the left parietal and occipital lobes and bilateral frontal lobes. Neurology has been consulted. Concern for cardioembolic etiology. Recommending DAPT x 21 days, statin, event monitor. Course complicated by HTN, hyperglycemia, hypercalcemia. Currently, patient reports ongoing but improving right side weakness and difficulty with coordination of the right hand. She has some abnormal sensation/numbness in the distal RLE. She has noted short term memory difficulty. Also with right side vision impairment. She denies headache.  \"  Family / Caregiver Present: No  Referring Practitioner: Dr. Walker Miles  Referral Date : 23  Diagnosis: acute ischemic stroke    Restrictions:  Restrictions/Precautions: Fall Risk  Position Activity Restriction  Other position/activity restrictions: R field cut     Pertinent medical information:  Additional Pertinent Hx: Per Dr. Jacinda Thorpe consult note, \"76 yo F with pmh HTN, hepatitis, and recent right total knee arthroplasty (2023) who initially

## 2023-07-28 NOTE — PROGRESS NOTES
Occupational Therapy  Facility/Department: [facility]  Rehabilitation Occupational Therapy Evaluation       Date: 23  Patient Name: Alexander Lopez       Room: F1W-7628/6362-93  MRN: [id number]  Account: [de-identified]   : 1955  (74 y.o.) Gender: female     Referring Practitioner: Dr Shantal Ruano  Diagnosis: CVA  Additional Pertinent Hx: 70 y.o. female who presented with 2 days of right side weakness. MRI revealed multifocal small acute to subacute infarcts highly suggestive of cardioembolic etiology. Pt with history of R knee replacement 2023, discharged to [facility] - and returned home. Restrictions  Restrictions/Precautions: Fall Risk  Other position/activity restrictions: R field cut  Equipment Used: Bed;Wheelchair    Subjective  Subjective: met pt in room, she is resting quietly in bed  Comments: Per RN ok for evaluation            Objective     Cognition  Overall Cognitive Status: Exceptions  Arousal/Alertness: Appropriate responses to stimuli  Following Commands: Follows one step commands with increased time; Follows one step commands with repetition (easily distracted)  Attention Span: Difficulty dividing attention  Safety Judgement: Decreased awareness of need for assistance;Decreased awareness of need for safety  Insights: Decreased awareness of deficits  Cognition Comment: some word finding problems, can be impulsive  Orientation  Overall Orientation Status: Within Functional Limits (some word finding issues)   Perception  Overall Perceptual Status: Impaired  Motor Planning:  (impaired motor planning & coordination R hand (dominal hand))  Sensation  Overall Sensation Status: WNL (denies any Numbness or tingling but will cont to assess)   ROM  LUE AROM (degrees)  LUE AROM : WFL  Left Hand AROM (degrees)  Left Hand AROM: WFL  RUE AROM (degrees)  RUE AROM : WFL  Right Hand AROM (degrees)  Right Hand AROM: WFL  Right Hand General AROM: slight dec coordination but functional  LUE Forehead Units: 8

## 2023-07-28 NOTE — PROGRESS NOTES
SLP ALL NOTES  Facility/Department: [address] REHAB  Initial Speech/Language/Cognitive Assessment    NAME: Mimi Rosado  : 1955   MRN: [id number]  ADMISSION DATE: 2023  ADMITTING DIAGNOSIS: Post CVA  has Blurred vision; Cataract of both eyes; Acute cerebrovascular accident (CVA) ([address]); and Acute ischemic stroke ([address]) on their problem list.   has a past medical history of Hepatitis and Hypertension. DATE ONSET:     Date of Eval: 2023   Evaluating Therapist: RONNA Merritt      Chart Reviewed:   Per ARU Consulting MD History and Physical Documentation revealed:   History of Present Illness/Hospital Course:  Patient is a 71 yo F with pmh HTN, hepatitis, and recent right total knee arthroplasty (2023) who initially presented 2023 with right side weakness. CT head negative for hemorrhage but showed possible recent ischemic change in the posterior left occipital lobe. CTA head/neck showed moderate focal narrowing of the right supraclinoid ICA. MRI brain showed acute to subacute small cortical infarcts throughout the left parietal and occipital lobes and bilateral frontal lobes. Neurology has been consulted. Concern for cardioembolic etiology. Recommending DAPT x 21 days, statin, event monitor. Course complicated by HTN, hyperglycemia, hypercalcemia. Currently, patient reports ongoing but improving right side weakness and difficulty with coordination of the right hand. She has some abnormal sensation/numbness in the distal RLE. She has noted short term memory difficulty. Also with right side vision impairment. She denies headache. She is potentially interested in coming to ARU to improve her function prior to returning home, however would like to discuss plans with her daughters first.     2023 CTA head Neck:   IMPRESSION:  Moderate focal narrowing of the right supraclinoid ICA. Otherwise, no flow limiting stenosis or occlusion within the head or neck.      2023 MRI Brain: information)  Effective Techniques: Large print    Expression  Primary Mode of Expression: Verbal  Verbal Expression  Verbal Expression: Within functional limits (WFL for or CFN; concept explanation)    Written Expression  Dominant Hand: Right (use of buildup for biographical information/Pt does self report not as legible and much smaller baseline; right sided weakness)  Written Expression: Within Functional Limits    Pragmatics/Social Functioning  Pragmatics: Within functional limits    Cognition:   Orientation  Overall Orientation Status: Within Functional Limits  Attention  Attention: Exceptions to [facility]  Alternating Attention: Mild  Divided Attention: Moderate  Selective Attention: [facility]  Sustained Attention: WFL  Memory  Memory: Exceptions to [facility]  Short-term Memory: Mild  Working Memory:  (minimal to moderate across testing task trials)  Problem Solving  Problem Solving: Exceptions to [facility]  Simple Functional Tasks: [facility]  Verbal Reasoning Skills: Mild (breakdown with abstraction)  Sequencing: Mild  Executive Function Skills:  (ongoing assessment as current deficits could impact IND function)  Numeric Reasoning  Numeric Reasoning: Exceptions to [facility]   Calculations: Mild  Money Management:  (TBA)  Time: Moderate  Abstract Reasoning  Abstract Reasoning: Exceptions to [facility]  Convergent Thinking: Mild (breakdown abstraction)  Divergent Thinking: Mild (breakdown abstraction)  Safety/Judgment  Safety/Judgment:  (TBA)    Prognosis:  Speech Therapy Prognosis  Prognosis: Good    Education:  Patient Education Response: Verbalizes understanding;Needs reinforcement  Safety Devices in place: Yes  Type of devices: All fall risk precautions in place    Therapy Time:   Individual   Time In 1455   Time Out 1540   Minutes 45           Electronically signed by  Rabia Batres. Chepe,MS,CCC,SLP [id number]  Speech and Language Pathologist  on 7/28/2023 at 5:08 PM

## 2023-07-28 NOTE — PROGRESS NOTES
4 Eyes Skin Assessment     NAME:  Lidia Joseph  YOB: 1955  MEDICAL RECORD NUMBER:  [id number]    The patient is being assessed for  Admission    I agree that at least one RN has performed a thorough Head to Toe Skin Assessment on the patient. ALL assessment sites listed below have been assessed. Areas assessed by both nurses: Tricia and [employer], Face, Ears, Shoulders, Back, Chest, Arms, Elbows, Hands, Sacrum. Buttock, Coccyx, Ischium, Legs. Feet and Heels, and Under Medical Devices         Does the Patient have a Wound?  No noted wound(s)       Ney Prevention initiated by RN: No  Wound Care Orders initiated by RN: No    Pressure Injury (Stage 3,4, Unstageable, DTI, NWPT, and Complex wounds) if present, place Wound referral order by RN under : No    New Ostomies, if present place, Ostomy referral order under : No     Nurse 1 eSignature: Electronically signed by Hollie Ventura RN on 7/28/23 at 12:41 PM EDT    **SHARE this note so that the co-signing nurse can place an eSignature**    Nurse 2 eSignature: Electronically signed by Sukhdeep Mejia RN, [address] [facility] [address] on 7/28/23 at 12:56 PM EDT

## 2023-07-28 NOTE — PLAN OF CARE
Problem: Discharge Planning  Goal: Discharge to home or other facility with appropriate resources  Outcome: Progressing     Problem: Safety - Adult  Goal: Free from fall injury  Outcome: Progressing     Problem: Skin/Tissue Integrity  Goal: Absence of new skin breakdown  Description: 1. Monitor for areas of redness and/or skin breakdown  2. Assess vascular access sites hourly  3. Every 4-6 hours minimum:  Change oxygen saturation probe site  4. Every 4-6 hours:  If on nasal continuous positive airway pressure, respiratory therapy assess nares and determine need for appliance change or resting period.   Outcome: Progressing     Problem: Neurosensory - Adult  Goal: Achieves stable or improved neurological status  Outcome: Progressing  Goal: Achieves maximal functionality and self care  Outcome: Progressing     Problem: Chronic Conditions and Co-morbidities  Goal: Patient's chronic conditions and co-morbidity symptoms are monitored and maintained or improved  Outcome: Progressing

## 2023-07-28 NOTE — PROGRESS NOTES
Ethnicity  \"Are you of , /a, or Emirati origin? \"  Check all that apply:  [x] A. No, not of , /a, or [place] Origin  [] B.  Yes, [place], [place] American, Chicano/a  [] C.  Yes, [facility]  [] D.  Yes, [place]  [] E.  Yes, another , , or Emirati origin  [] X. Patient unable to respond    Race  \"What is your race? \"  Check all that apply:  [] A. White  [x] B. Black or   [] C. American Maria Alejandra or [place] Native  [] D.  Maria Alejandra  [] E. [place]  [] F. Macedonian  [] G. [place]  [] Poly Bradford  [] I. [place]  [] J.  Other   [] K.   [] L. Gambian or [place]  [] M. Gibraltarian  [] N. Other [address] [facility]  [] X. Patient unable to respond    High Risk Drug Classes:  Use and Indication    Is taking: Check if the pt is taking any medications by pharmacological classification, not how it is used, in the following classes  Indication noted: If column 1 is checked, check if there is an indication noted for all meds in the drug class Is taking  (check all that apply) Indication noted (check all that apply)   Antipsychotic [] []   Anticoagulant [x] [x]   Antibiotic [] []   Opioid [] []   Antiplatelet [x] [x]   Hypoglycemic (including insulin) [] []   None of the above []     Special Treatments, Procedures, and Programs    Check all of the following treatments, procedures, and programs that apply on admission. On admission (check all that apply)   Cancer Treatments   A1. Chemotherapy []           A2. IV []           A3. Oral []           A10. Other []   B1. Radiation []   Respiratory Therapies   C1. Oxygen Therapy []           C2. Continuous []           C3. Intermittent []           C4. High-concentration []   D1. Suctioning []           D2. Scheduled []           D3. As needed []   E1. Tracheostomy Care []   F1.  Invasive Mechanical Ventilator (ventilator or respirator) []   G1. Non-invasive Mechanical Ventilator []           G2. BiPAP []

## 2023-07-28 NOTE — PROGRESS NOTES
Patient admitted to rehab with CVA. A/Ox4. Transfers with walker. Mobility restrictions: none. On regular carb control diet, tolerating well. Medications taken whole. On Plavix for DVT prophylaxis. Skin: intact. Oxygen: none. LDA: right forearm. Has been continent of bowel and continent of bladder. LB 7/27. Chair/bed alarms in use and call light in reach. Will monitor for safety.

## 2023-07-28 NOTE — PLAN OF CARE
[address] [place] [address], [address] [place] [address]  [phone]    Lum Clonts    : 1955  Acct #: [de-identified]  MRN: [id number]   PHYSICIAN:  Ramya Purcell MD  Primary Problem    Patient Active Problem List   Diagnosis    Blurred vision    Cataract of both eyes    Acute cerebrovascular accident (CVA) ([address])    Acute ischemic stroke [facility])       Rehabilitation Diagnosis:     Acute ischemic stroke ([address]) [I63.9]       ADMIT DATE:2023    Patient Goals: ***    Admitting Impairments: ***  Barriers: ***  Participation: ***     CARE PLAN     NURSING:  Lum Clonts while on this unit will:     [] Be continent of bowel and bladder     [] Have an adequate number of bowel movements  [] Urinate with no urinary retention >300ml in bladder  [] Complete bladder protocol with staton removal  [] Maintain O2 SATs at ___%  [] Have pain managed while on ARU       [] Be pain free by discharge   [] Have no skin breakdown while on ARU  [] Have improved skin integrity via wound measurements  [] Have no signs/symptoms of infection at the wound site  [] Be free from injury during hospitalization   [] Complete education with patient/family with understanding demonstrated for:  [] Adjustment   [] Other:   Nursing interventions may include bowel/bladder training, education for medical assistive devices, medication education, O2 saturation management, energy conservation, stress management techniques, fall prevention, alarms protocol, seating and positioning, skin/wound care, pressure relief instruction,dressing changes,  infection protection, DVT prophylaxis, and/or assistance with in room safety with transfers to bed, toilet, wheelchair, shower as well as bathroom activities and hygiene.      Patient/caregiver education for:   [] Disease/sustained injury/management      [] Medication Use   [] Surgical intervention   [] Safety   [] Body mechanics and or joint

## 2023-07-28 NOTE — PROGRESS NOTES
Pt transfer to room 99 [id number] by this RN. Pt verbalized she would call her daughters to let them know about the transfer. Report given to OSCAR THOMPSON; all questions answered. Belongings with pt include: purse, cell phone, shoes, ring, watch and clothing.  Electronically signed by Saul Campos RN on 7/28/23 at 9:50 AM EDT

## 2023-07-28 NOTE — PLAN OF CARE
Problem: Discharge Planning  Goal: Discharge to home or other facility with appropriate resources  Outcome: Progressing     Problem: Safety - Adult  Goal: Free from fall injury  Outcome: Progressing     Problem: ABCDS Injury Assessment  Goal: Absence of physical injury  Outcome: Progressing     Problem: Nutrition Deficit:  Goal: Optimize nutritional status  Outcome: Progressing

## 2023-07-29 LAB
ANION GAP SERPL CALCULATED.3IONS-SCNC: 10 MMOL/L (ref 3–16)
BASOPHILS # BLD: 0.1 K/UL (ref 0–0.2)
BASOPHILS NFR BLD: 1.1 %
BUN SERPL-MCNC: 20 MG/DL (ref 7–20)
C DIFF TOX A+B STL QL IA: NORMAL
CALCIUM SERPL-MCNC: 11.2 MG/DL (ref 8.3–10.6)
CHLORIDE SERPL-SCNC: 104 MMOL/L (ref 99–110)
CO2 SERPL-SCNC: 20 MMOL/L (ref 21–32)
CREAT SERPL-MCNC: 1 MG/DL (ref 0.6–1.2)
DEPRECATED RDW RBC AUTO: 16 % (ref 12.4–15.4)
EOSINOPHIL # BLD: 0.2 K/UL (ref 0–0.6)
EOSINOPHIL NFR BLD: 2.7 %
GFR SERPLBLD CREATININE-BSD FMLA CKD-EPI: >60 ML/MIN/{1.73_M2}
GLUCOSE SERPL-MCNC: 135 MG/DL (ref 70–99)
HCT VFR BLD AUTO: 40.3 % (ref 36–48)
HGB BLD-MCNC: 13 G/DL (ref 12–16)
LYMPHOCYTES # BLD: 3.4 K/UL (ref 1–5.1)
LYMPHOCYTES NFR BLD: 40.8 %
MCH RBC QN AUTO: 25 PG (ref 26–34)
MCHC RBC AUTO-ENTMCNC: 32.2 G/DL (ref 31–36)
MCV RBC AUTO: 77.5 FL (ref 80–100)
MONOCYTES # BLD: 0.6 K/UL (ref 0–1.3)
MONOCYTES NFR BLD: 7.5 %
NEUTROPHILS # BLD: 4 K/UL (ref 1.7–7.7)
NEUTROPHILS NFR BLD: 47.9 %
PLATELET # BLD AUTO: 381 K/UL (ref 135–450)
PMV BLD AUTO: 7.8 FL (ref 5–10.5)
POTASSIUM SERPL-SCNC: 4.2 MMOL/L (ref 3.5–5.1)
PREALB SERPL-MCNC: 26.2 MG/DL (ref 20–40)
PTH-INTACT SERPL-MCNC: 104.9 PG/ML (ref 14–72)
RBC # BLD AUTO: 5.2 M/UL (ref 4–5.2)
SODIUM SERPL-SCNC: 134 MMOL/L (ref 136–145)
WBC # BLD AUTO: 8.3 K/UL (ref 4–11)

## 2023-07-29 PROCEDURE — 80048 BASIC METABOLIC PNL TOTAL CA: CPT

## 2023-07-29 PROCEDURE — 97535 SELF CARE MNGMENT TRAINING: CPT

## 2023-07-29 PROCEDURE — 83970 ASSAY OF PARATHORMONE: CPT

## 2023-07-29 PROCEDURE — 87449 NOS EACH ORGANISM AG IA: CPT

## 2023-07-29 PROCEDURE — 6360000002 HC RX W HCPCS: Performed by: PHYSICAL MEDICINE & REHABILITATION

## 2023-07-29 PROCEDURE — 97530 THERAPEUTIC ACTIVITIES: CPT

## 2023-07-29 PROCEDURE — 36415 COLL VENOUS BLD VENIPUNCTURE: CPT

## 2023-07-29 PROCEDURE — 92610 EVALUATE SWALLOWING FUNCTION: CPT

## 2023-07-29 PROCEDURE — 94760 N-INVAS EAR/PLS OXIMETRY 1: CPT

## 2023-07-29 PROCEDURE — 85025 COMPLETE CBC W/AUTO DIFF WBC: CPT

## 2023-07-29 PROCEDURE — 87324 CLOSTRIDIUM AG IA: CPT

## 2023-07-29 PROCEDURE — 84134 ASSAY OF PREALBUMIN: CPT

## 2023-07-29 PROCEDURE — 1280000000 HC REHAB R&B

## 2023-07-29 PROCEDURE — 97116 GAIT TRAINING THERAPY: CPT

## 2023-07-29 PROCEDURE — 6370000000 HC RX 637 (ALT 250 FOR IP): Performed by: PHYSICAL MEDICINE & REHABILITATION

## 2023-07-29 PROCEDURE — 97129 THER IVNTJ 1ST 15 MIN: CPT

## 2023-07-29 PROCEDURE — 9990000010 HC NO CHARGE VISIT

## 2023-07-29 RX ORDER — LOPERAMIDE HYDROCHLORIDE 2 MG/1
2 CAPSULE ORAL 4 TIMES DAILY PRN
Status: DISCONTINUED | OUTPATIENT
Start: 2023-07-29 | End: 2023-08-05 | Stop reason: HOSPADM

## 2023-07-29 RX ORDER — SODIUM CHLORIDE 0.9 % (FLUSH) 0.9 %
5-40 SYRINGE (ML) INJECTION 2 TIMES DAILY
Status: DISCONTINUED | OUTPATIENT
Start: 2023-07-29 | End: 2023-07-30

## 2023-07-29 RX ADMIN — CLOPIDOGREL BISULFATE 75 MG: 75 TABLET ORAL at 07:51

## 2023-07-29 RX ADMIN — LOSARTAN POTASSIUM 50 MG: 50 TABLET, FILM COATED ORAL at 07:52

## 2023-07-29 RX ADMIN — ENOXAPARIN SODIUM 40 MG: 100 INJECTION SUBCUTANEOUS at 07:51

## 2023-07-29 RX ADMIN — ASPIRIN 81 MG: 81 TABLET, COATED ORAL at 07:51

## 2023-07-29 RX ADMIN — LOPERAMIDE HYDROCHLORIDE 2 MG: 2 CAPSULE ORAL at 22:14

## 2023-07-29 RX ADMIN — ATORVASTATIN CALCIUM 80 MG: 80 TABLET, FILM COATED ORAL at 20:22

## 2023-07-29 RX ADMIN — CHLORTHALIDONE 25 MG: 25 TABLET ORAL at 07:52

## 2023-07-29 NOTE — PROGRESS NOTES
Occupational Therapy  Facility/Department: [address] REHAB  Rehabilitation Occupational Therapy Daily Treatment Note   If pt is d/c'd prior to next tx session this note will serve as d/c summary. Date: 23  Patient Name: Mimi Rosado       Room: T6O-7459/4154-99  MRN: [id number]  Account: [de-identified]   : 1955  (74 y.o.) Gender: female                    Past Medical History:  has a past medical history of Hepatitis and Hypertension. Past Surgical History:   has a past surgical history that includes Hysterectomy; tumor excision; and knee surgery (Right, ). Restrictions  Restrictions/Precautions: Fall Risk  Other position/activity restrictions: R field cut - c-diff ruled out on 2023  Equipment Used: Bed, Wheelchair    Subjective  Subjective: \"I've been waiting for this shower since early this morning. \"  Restrictions/Precautions: Fall Risk             Objective     Cognition  Overall Cognitive Status: Exceptions  Arousal/Alertness: Appropriate responses to stimuli  Attention Span: Difficulty dividing attention (can be distractible)  Safety Judgement: Decreased awareness of need for assistance (Repeated cues for hand  and walker placement for walker safety. When cued responds \"I got it! I got it! \")  Problem Solving: Able to problem solve independently (with extra time provided)  Insights: Decreased awareness of deficits  Cognition Comment: Impulsive initially, turning on the shower with sock on and walker in the shower. (\"I just wanted that shower so bad. \")  Very thorough with appropriate sequencing for showering and dressing  Orientation  Overall Orientation Status: Within Functional Limits  Orientation Level: Oriented X4         ADL  Grooming/Oral Hygiene  Assistance Level: Stand by assist;Contact guard assist  Skilled Clinical Factors: Standing at sink with RW and sink for support for brushing teeth, brushing hair and washing, drying hands  Upper Extremity Bathing  Assistance Level: with pt at end of OT session)    Patient Education  Education  Education Given To: Patient  Education Provided: Safety;Transfer Training  Education Method: Demonstration;Verbal  Barriers to Learning: Cognition  Education Outcome: Continued education needed    Plan  Occupational Therapy Plan  Times Per Week: 5-6  Times Per Day: Twice a day  Hours Per Day: 1.5 hours  Specific Instructions for Next Treatment: x 10-14  days  Current Treatment Recommendations: Strengthening;Balance training;Functional mobility training; Endurance training;ROM;Gait training;Neuromuscular re-education;Patient/Caregiver education & training;Self-Care / ADL;Equipment evaluation, education, & procurement; Coordination training; Safety education & training;Home management training    Goals  Patient Goals   Patient goals : \"I want to get stronger\" \"work on my vision\"  Short Term Goals  Time Frame for Short Term Goals: by 5-7 days pt will complete  Short Term Goal 1: Grooming & feeding tasks IND'ly  Short Term Goal 2: UB dressing IND'ly  Short Term Goal 3: LB dressing IND'ly using A/E prn  Short Term Goal 4: toilet tasks w/ MI using GBs (or TSF)  Short Term Goal 5: Pt will visually scan/attend to objects on R side 100% during ADLs/IADLs  Long Term Goals  Time Frame for Long Term Goals : by 10-14 days pt will complete  Long Term Goal 1: shower level bathing w/ MI using shower chair + GBs  Long Term Goal 2: household transfers w/ MI using LRAD  Long Term Goal 3: tolerate standing x 8-10 minutes w/ MI during simple meal prep  Long Term Goal 4: address caregiver training & DME needs prior to DC  Long Term Goal 5: address R hand function using 9 hole peg & dynamometer, write goals accordingly      Therapy Time   Individual Concurrent Group Co-treatment   Time In 1012         Time Out 1105         Minutes 53         Timed Code Treatment Minutes: 101 Penelope Olsen, Nichelle  OT license: [id number]  Electronically signed by Anam Kaur

## 2023-07-29 NOTE — PROGRESS NOTES
Physical Therapy  Facility/Department: [facility]  Rehabilitation Physical Therapy Treatment Note    NAME: Tr Raines  : 1955 (89 y.o.)  MRN: [id number]  CODE STATUS: Full Code    Date of Service: 23       Restrictions:  Restrictions/Precautions: Fall Risk  Position Activity Restriction  Other position/activity restrictions: R field cut, (-) c-diff      Pertinent medical information:  Additional Pertinent Hx: Per Dr. Abarca  consult note, \"76 yo F with pmh HTN, hepatitis, and recent right total knee arthroplasty (2023) who initially presented 2023 with right side weakness. CT head negative for hemorrhage but showed possible recent ischemic change in the posterior left occipital lobe. CTA head/neck showed moderate focal narrowing of the right supraclinoid ICA. MRI brain showed acute to subacute small cortical infarcts throughout the left parietal and occipital lobes and bilateral frontal lobes. Neurology has been consulted. Concern for cardioembolic etiology. Recommending DAPT x 21 days, statin, event monitor. Course complicated by HTN, hyperglycemia, hypercalcemia. Currently, patient reports ongoing but improving right side weakness and difficulty with coordination of the right hand. She has some abnormal sensation/numbness in the distal RLE. She has noted short term memory difficulty. Also with right side vision impairment. She denies headache. \"    SUBJECTIVE  Subjective  Subjective: Patient up in recliner chair upon arrival and is agreeable to therapy. Patient is tearful at times and reports this is newer. PT assured her this can happen following a stroke. She reports she is just more aware of the blessings in her life.   Pain: none reported     Social/Functional History  Lives With: Alone (Pet dog named Stella (miniature pincher))  Type of Home: Apartment  Home Layout: One level, Laundry in basement  Home Access: Stairs to enter with rails  Entrance Stairs - Number of Steps: 4

## 2023-07-29 NOTE — PROGRESS NOTES
Patient admitted to rehab with CVA. A/Ox4. Transfers with one assist with gait belt and front wheeled walker for ambulation. Mobility restrictions: WBAT. On regular carb control diet, tolerating well. Medications taken whole with fluids. On Plavix, aspirin and lovenox for DVT prophylaxis. Skin: intact. Oxygen: room air. LDA: PIV in right forearm. Has been continent of bowel and  of bladder. LBM 7/29. Having loose stools, C-diff specimen was sent to lab, negative. PTH, C-diff and Calcium results Perfect Served to Dr Shane Olsen. Chair/bed alarms in use and call light in reach. Will monitor for safety.

## 2023-07-29 NOTE — PROGRESS NOTES
SLP ALL NOTES  Facility/Department: [address] REHAB   CLINICAL BEDSIDE SWALLOW EVALUATION    NAME: Dwight Newberry  : 1955  MRN: [id number]    ADMISSION DATE: 2023  ADMITTING DIAGNOSIS: has Blurred vision; Cataract of both eyes; Acute cerebrovascular accident (CVA) ([address]); and Acute ischemic stroke [facility]) on their problem list.  ONSET DATE: 2023  Evaluation Date: 2023    Per ARU Consulting MD History and Physical Documentation revealed:   History of Present Illness/Hospital Course:  Patient is a 71 yo F with pmh HTN, hepatitis, and recent right total knee arthroplasty (2023) who initially presented 2023 with right side weakness. CT head negative for hemorrhage but showed possible recent ischemic change in the posterior left occipital lobe. CTA head/neck showed moderate focal narrowing of the right supraclinoid ICA. MRI brain showed acute to subacute small cortical infarcts throughout the left parietal and occipital lobes and bilateral frontal lobes. Neurology has been consulted. Concern for cardioembolic etiology. Recommending DAPT x 21 days, statin, event monitor. Course complicated by HTN, hyperglycemia, hypercalcemia. Currently, patient reports ongoing but improving right side weakness and difficulty with coordination of the right hand. She has some abnormal sensation/numbness in the distal RLE. She has noted short term memory difficulty. Also with right side vision impairment. She denies headache. She is potentially interested in coming to ARU to improve her function prior to returning home, however would like to discuss plans with her daughters first.      2023 CTA head Neck:   IMPRESSION:  Moderate focal narrowing of the right supraclinoid ICA. Otherwise, no flow limiting stenosis or occlusion within the head or neck.      2023 MRI Brain:   FINDINGS:  INTRACRANIAL STRUCTURES/VENTRICLES: Acute to subacute small cortical infarcts  throughout the left parietal and occipital situations     Treatment Plan  Requires SLP Intervention: Yes  Duration of Treatment: 5 times a week regulo on ARU unless otherwise notified. Additional recommendations at d/c  D/C Recommendations: Ongoing speech therapy is recommended during this hospitalization       Recommended Diet and Intervention  Solids: IDDSI 7 Regular Solids; Liquids: IDDSI 0 Thin Liquids;  Meds: Meds whole with thin liquids      Compensatory Swallowing Strategies  Compensatory Swallowing Strategies : Upright as possible for all oral intake    Treatment/Goals  Short-term Goals  Timeframe for Short-term Goals: 7-10 days  Dysphagia Goals: The patient will tolerate recommended diet without observed clinical signs of aspiration    General  Chart Reviewed: Yes  Behavior/Cognition: Alert; Cooperative;Pleasant mood  Respiratory Status: Room air  Communication Observation: Functional  Follows Directions: Simple  Dentition: Some missing teeth; Adequate  Patient Positioning: Upright in bed  Baseline Vocal Quality: Normal  Volitional Cough: Strong  Volitional Swallow:  (WFL)  Prior Dysphagia History: No reported swallowing difficulty. Pt reports hx of acid reflux, took meds for it and no longer has this. Consistencies Administered: Regular;Easy to chew;Pureed; Thin - straw;Mixed Consistencies    Vision/Hearing  Vision:  pt reports deficits since CVA  Hearing: [facility]    Oral Motor Deficits  Consistencies Administered: Regular;Easy to chew;Pureed; Thin - straw;Mixed Consistencies    Oral Phase Dysfunction  Oral Phase  Oral Phase: WFL     Indicators of Pharyngeal Phase Dysfunction   Pharyngeal Phase  Pharyngeal Phase: WFL  Pharyngeal Phase   Pharyngeal Phase: WFL    Prognosis  Good for safe and adequate PO    Education  Patient Education: Review of POC  Patient Education Response: Verbalizes understanding;Needs reinforcement          Therapy Time  SLP Individual Minutes  Time In: 0825  Time Out: 3989  Minutes: [address], [address] [facility] CCC/SLP 0354  Speech

## 2023-07-29 NOTE — PROGRESS NOTES
ACUTE REHAB UNIT  SPEECH/LANGUAGE PATHOLOGY      [x] Daily  [] Weekly Care Conference Note  [] Discharge    Indio Rodríguez      :1955  BFI:[id number]  Rehab Dx/Hx: Acute ischemic stroke ([address] [facility]) [I63.9]    Precautionsvisual defict  Home situation: lives alone in an apartment   Dx: [] Aphasia  [] Dysarthria  [] Apraxia   [] Oropharyngeal dysphagia [x] Cognitive Impairment  [] Other:   Date of Admit: 2023  Room #: R8V-9258/3257-01  Date: 2023       Current functional status (updated daily):         Pt being seen for : [] Speech/Language Treatment  [] Dysphagia Treatment [x] Cognitive Treatment  [] Other:  Current Diet Order:ADULT DIET;  Regular; 4 carb choices (60 gm/meal)   Communication: [x]WFL  [] Aphasia  [] Dysarthria  [] Apraxia  [] Pragmatic Impairment [] Non-verbal  [] Hearing Loss  [] Other:   Cognition: [] WFL  [] Mild  [] Moderate  [] Severe [] Unable to Assess  [] Other:  Memory: [] WFL  [x] Mild  [x] Moderate  [] Severe [] Unable to Assess  [] Other:  Behavior: [x] Alert  [x] Cooperative  []  Pleasant  [] Confused  [] Agitated  [] Uncooperative  [] Distractible [] Motivated  [] Self-Limiting [] Anxious  [] Other:  Endurance:  [x] Adequate for participation in SLP sessions  [] Reduced overall  [] Lethargic  [] Other:  Safety: [] No concerns at this time  [x] Reduced insight into deficits  []  Reduced safety awareness [] Not following call light procedures  [] Unable to Assess  [] Other:  Swallowing Precautions: upright for PO  Barriers toward progress: Cognitive deficit and Limited safety awareness  Discharge recommendations:  [] Home independently  [] Home with assistance []  24 hour supervision  [] ECF [x] Other:TBD  Continued Tx Upon Discharge: ? [] Yes [] No [x] TBD based on progress while on ARU [] Vital Stim indicated [] Other:   Estimated discharge date: TBD         Date: 2023      Tx session 1 Tx session 2   Total Timed Code Min 15    Total Treatment Minutes 30 (CSE + 15 tx)    Individual Treatment Minutes     Group Treatment Minutes 0 0   Co-Treat Minutes 0 0   Variance/Reason:      Pain denies    Pain Intervention [] RN notified  [] Repositioned  [] Intervention offered and patient declined  [] N/A  [x] Other: [] RN notified  [] Repositioned  [] Intervention offered and patient declined  [] N/A  [] Other:   Subjective         Objective:  Goals     Short Term Goals  Time Frame for Short Term Goals: Pt goal is to return home; care for self and resume baseline responsibilities; walk           Goal 1: Pt will demosntrate improved recall of daily events; learned safety strategies; new information with compensatory strategies PRN with set up and intermittent assist    Review of white board info and written indo given and reviewed. Pt with questions about therapy that are addressed. Pt reports she has no weakness from her stroke. R hand weakness observed during CANDIDA. Goal 2: Pt will demonstrate functional VPS/PS/numeric reasoning across graded tasks requiring alternating/divided attention wtih use of compensatory strategies PRN and <mild assist Not addressed this date. Goal 3: Pt will participate in ongoing assessment incorporating visual scanning, visuo spatial organiaation;with additional goal PRN   Pt locating phone numbers in phone accurately. Pt demonstrates funcaiontal problem solving within session: pt's phone is low battery and she does not have a  so she is writing down phone numbers before she loses her charge. Time Frame for Short Term Goals: Pt goal is to return home; care for self and resume baseline responsibilities; walk         Other areas targeted:     Education:   Review of purpose of tx    Safety Devices: [x] Call light within reach  [] Chair alarm activated  [x] Bed alarm activated  [] Other: [] Call light within reach  [] Chair alarm activated  [] Bed alarm activated  [] Other:    Assessment: SLP Assessment Impressions:   1.

## 2023-07-29 NOTE — PROGRESS NOTES
Physical Therapy  Attempt Note    Emerson Fontana  [id number]/3257-01    PT attempted AM treatment, but patient declined therapy at this time. She reports she would like to shower first due to diarrhea through the night. C-diff lab test sent out. PT notified Candace Guevara, of request for shower this AM.  PT will try back later for therapy.      Concepción Mcconnell, [id number]

## 2023-07-30 VITALS
SYSTOLIC BLOOD PRESSURE: 144 MMHG | HEIGHT: 61 IN | OXYGEN SATURATION: 95 % | DIASTOLIC BLOOD PRESSURE: 89 MMHG | BODY MASS INDEX: 30.76 KG/M2 | TEMPERATURE: 98.3 F | HEART RATE: 96 BPM | WEIGHT: 162.92 LBS | RESPIRATION RATE: 16 BRPM

## 2023-07-30 PROCEDURE — 6360000002 HC RX W HCPCS: Performed by: PHYSICAL MEDICINE & REHABILITATION

## 2023-07-30 PROCEDURE — 1280000000 HC REHAB R&B

## 2023-07-30 PROCEDURE — 6370000000 HC RX 637 (ALT 250 FOR IP): Performed by: PHYSICAL MEDICINE & REHABILITATION

## 2023-07-30 PROCEDURE — 94760 N-INVAS EAR/PLS OXIMETRY 1: CPT

## 2023-07-30 RX ADMIN — CLOPIDOGREL BISULFATE 75 MG: 75 TABLET ORAL at 07:54

## 2023-07-30 RX ADMIN — ENOXAPARIN SODIUM 40 MG: 100 INJECTION SUBCUTANEOUS at 07:53

## 2023-07-30 RX ADMIN — LOSARTAN POTASSIUM 50 MG: 50 TABLET, FILM COATED ORAL at 07:54

## 2023-07-30 RX ADMIN — CHLORTHALIDONE 25 MG: 25 TABLET ORAL at 07:54

## 2023-07-30 RX ADMIN — ATORVASTATIN CALCIUM 80 MG: 80 TABLET, FILM COATED ORAL at 20:19

## 2023-07-30 RX ADMIN — ASPIRIN 81 MG: 81 TABLET, COATED ORAL at 07:54

## 2023-07-30 NOTE — PROGRESS NOTES
Pt awake and AAO sitting up on side of bed and requesting to use the BR. Denies pain. Pt s/p CVA with R robbie. No dysphagia. No aphasia. Can be unsteady at times. Lungs clear. No sob or cough. On RA. Belly round and soft with active BS. LBm today. Pt has had loose BMs. Cdiff neg. Up from sit to stand with CGA and GB and walker. Toilets with CGA. Pt did urinate and passed loose stool. Pt can get into bed with CGA. Pt turns to R side. No edema noted. On telemetry, SR to [address] [employer]. Medicated with Imodium per prn orders for lose stool. Call light in reach. Bed alarm on.

## 2023-07-30 NOTE — PROGRESS NOTES
Patient admitted to rehab with CVA. A/Ox4. Transfers with one assist with gait belt and front wheeled walker for ambulation. Mobility restrictions: WBAT. On regular carb control diet, tolerating well. Medications taken whole with fluids. On Plavix, aspirin and lovenox for DVT prophylaxis. Skin: intact. Oxygen: room air. LDA: none. Has been continent of bowel and  of bladder. LBM 7/29. Chair/bed alarms in use and call light in reach. Will monitor for safety.

## 2023-07-30 NOTE — PLAN OF CARE
Problem: Discharge Planning  Goal: Discharge to home or other facility with appropriate resources  Outcome: Progressing  Flowsheets (Taken 7/30/2023 0805)  Discharge to home or other facility with appropriate resources:   Identify barriers to discharge with patient and caregiver   Identify discharge learning needs (meds, wound care, etc)     Problem: Safety - Adult  Goal: Free from fall injury  Outcome: Progressing  Flowsheets (Taken 7/30/2023 0805)  Free From Fall Injury:   Instruct family/caregiver on patient safety   Based on caregiver fall risk screen, instruct family/caregiver to ask for assistance with transferring infant if caregiver noted to have fall risk factors     Problem: ABCDS Injury Assessment  Goal: Absence of physical injury  Outcome: Progressing  Flowsheets (Taken 7/30/2023 0805)  Absence of Physical Injury: Implement safety measures based on patient assessment     Problem: Nutrition Deficit:  Goal: Optimize nutritional status  Outcome: Progressing  Flowsheets (Taken 7/30/2023 0805)  Nutrient intake appropriate for improving, restoring, or maintaining nutritional needs: Monitor oral intake, labs, and treatment plans     Problem: Neurosensory - Adult  Goal: Achieves stable or improved neurological status  Outcome: Progressing  Flowsheets (Taken 7/30/2023 0805)  Achieves stable or improved neurological status:   Assess for and report changes in neurological status   Maintain blood pressure and fluid volume within ordered parameters to optimize cerebral perfusion and minimize risk of hemorrhage     Problem: Skin/Tissue Integrity - Adult  Goal: Skin integrity remains intact  Outcome: Progressing  Flowsheets (Taken 7/30/2023 0805)  Skin Integrity Remains Intact: Monitor for areas of redness and/or skin breakdown     Problem: Skin/Tissue Integrity - Adult  Goal: Incisions, wounds, or drain sites healing without S/S of infection  Outcome: Progressing  Flowsheets (Taken 7/30/2023 0805)  Incisions, Wounds, or Drain Sites Healing Without Sign and Symptoms of Infection: TWICE DAILY: Assess and document skin integrity     Problem: Musculoskeletal - Adult  Goal: Return mobility to safest level of function  Outcome: Progressing  Flowsheets (Taken 7/30/2023 0805)  Return Mobility to Safest Level of Function:   Assess patient stability and activity tolerance for standing, transferring and ambulating with or without assistive devices   Assist with transfers and ambulation using safe patient handling equipment as needed     Problem: Infection - Adult  Goal: Absence of infection during hospitalization  Outcome: Progressing  Flowsheets (Taken 7/30/2023 0805)  Absence of infection during hospitalization:   Assess and monitor for signs and symptoms of infection   Monitor lab/diagnostic results     Problem: Metabolic/Fluid and Electrolytes - Adult  Goal: Electrolytes maintained within normal limits  Outcome: Progressing  Flowsheets (Taken 7/30/2023 0805)  Electrolytes maintained within normal limits: Monitor labs and assess patient for signs and symptoms of electrolyte imbalances

## 2023-07-31 LAB
25(OH)D3 SERPL-MCNC: 47.9 NG/ML
ANION GAP SERPL CALCULATED.3IONS-SCNC: 11 MMOL/L (ref 3–16)
BASOPHILS # BLD: 0.2 K/UL (ref 0–0.2)
BASOPHILS NFR BLD: 2.5 %
BUN SERPL-MCNC: 21 MG/DL (ref 7–20)
CALCIUM SERPL-MCNC: 11 MG/DL (ref 8.3–10.6)
CHLORIDE SERPL-SCNC: 104 MMOL/L (ref 99–110)
CO2 SERPL-SCNC: 22 MMOL/L (ref 21–32)
CREAT SERPL-MCNC: 1 MG/DL (ref 0.6–1.2)
DEPRECATED RDW RBC AUTO: 15.8 % (ref 12.4–15.4)
EOSINOPHIL # BLD: 0.2 K/UL (ref 0–0.6)
EOSINOPHIL NFR BLD: 2.9 %
GFR SERPLBLD CREATININE-BSD FMLA CKD-EPI: >60 ML/MIN/{1.73_M2}
GLUCOSE SERPL-MCNC: 119 MG/DL (ref 70–99)
HCT VFR BLD AUTO: 39.4 % (ref 36–48)
HGB BLD-MCNC: 12.7 G/DL (ref 12–16)
LYMPHOCYTES # BLD: 3.9 K/UL (ref 1–5.1)
LYMPHOCYTES NFR BLD: 48.7 %
MCH RBC QN AUTO: 25.1 PG (ref 26–34)
MCHC RBC AUTO-ENTMCNC: 32.1 G/DL (ref 31–36)
MCV RBC AUTO: 78 FL (ref 80–100)
MONOCYTES # BLD: 0.7 K/UL (ref 0–1.3)
MONOCYTES NFR BLD: 8.8 %
NEUTROPHILS # BLD: 3 K/UL (ref 1.7–7.7)
NEUTROPHILS NFR BLD: 37.1 %
PLATELET # BLD AUTO: 335 K/UL (ref 135–450)
PMV BLD AUTO: 8.1 FL (ref 5–10.5)
POTASSIUM SERPL-SCNC: 4.8 MMOL/L (ref 3.5–5.1)
RBC # BLD AUTO: 5.05 M/UL (ref 4–5.2)
SODIUM SERPL-SCNC: 137 MMOL/L (ref 136–145)
WBC # BLD AUTO: 8.1 K/UL (ref 4–11)

## 2023-07-31 PROCEDURE — 6370000000 HC RX 637 (ALT 250 FOR IP): Performed by: PHYSICAL MEDICINE & REHABILITATION

## 2023-07-31 PROCEDURE — 94760 N-INVAS EAR/PLS OXIMETRY 1: CPT

## 2023-07-31 PROCEDURE — 97535 SELF CARE MNGMENT TRAINING: CPT

## 2023-07-31 PROCEDURE — 97110 THERAPEUTIC EXERCISES: CPT

## 2023-07-31 PROCEDURE — 6360000002 HC RX W HCPCS: Performed by: PHYSICAL MEDICINE & REHABILITATION

## 2023-07-31 PROCEDURE — 97530 THERAPEUTIC ACTIVITIES: CPT

## 2023-07-31 PROCEDURE — 1280000000 HC REHAB R&B

## 2023-07-31 PROCEDURE — 97129 THER IVNTJ 1ST 15 MIN: CPT

## 2023-07-31 PROCEDURE — 97116 GAIT TRAINING THERAPY: CPT

## 2023-07-31 PROCEDURE — 36415 COLL VENOUS BLD VENIPUNCTURE: CPT

## 2023-07-31 PROCEDURE — 85025 COMPLETE CBC W/AUTO DIFF WBC: CPT

## 2023-07-31 PROCEDURE — 82306 VITAMIN D 25 HYDROXY: CPT

## 2023-07-31 PROCEDURE — 97130 THER IVNTJ EA ADDL 15 MIN: CPT

## 2023-07-31 PROCEDURE — 80048 BASIC METABOLIC PNL TOTAL CA: CPT

## 2023-07-31 RX ADMIN — LOPERAMIDE HYDROCHLORIDE 2 MG: 2 CAPSULE ORAL at 08:12

## 2023-07-31 RX ADMIN — ATORVASTATIN CALCIUM 80 MG: 80 TABLET, FILM COATED ORAL at 19:35

## 2023-07-31 RX ADMIN — CLOPIDOGREL BISULFATE 75 MG: 75 TABLET ORAL at 08:00

## 2023-07-31 RX ADMIN — ENOXAPARIN SODIUM 40 MG: 100 INJECTION SUBCUTANEOUS at 08:00

## 2023-07-31 RX ADMIN — CHLORTHALIDONE 25 MG: 25 TABLET ORAL at 07:59

## 2023-07-31 RX ADMIN — ASPIRIN 81 MG: 81 TABLET, COATED ORAL at 08:00

## 2023-07-31 RX ADMIN — LOSARTAN POTASSIUM 50 MG: 50 TABLET, FILM COATED ORAL at 08:00

## 2023-07-31 ASSESSMENT — 9 HOLE PEG TEST
TESTTIME_SECONDS: 30
TEST_RESULT: IMPAIRED
TEST_RESULT: IMPAIRED
TESTTIME_SECONDS: 53

## 2023-07-31 NOTE — PROGRESS NOTES
HTN, uncontrolled  -continue chlorthalidone (newly added), losartan -- monitor trend, slightly elevated today, may need to increase regimen     HLD  -continue atorvastatin     DM2 with hyperglycemia (Hgb A1C 6.8%)  -Diet education  -monitor blood sugars intermittently to determine if metformin is indicated    HIV  -Patient tells me today that she was taking TRIUMEQ at home.   -She will have daughter bring in supply     Hypercalcemia  -PTH elevated, vitamin D sufficient  -Possibly primary hyperparathyroidism. Will discuss f/u plan with patient. Recent right total knee arthroplasty (6/7/2023 with Dr. Ricardo Ford)   -contributing to functional impairments  -PT/OT     Mild OP dysphagia  -Dietitian and SLP consults.   -Currently on regular + thins     Bladder   -High risk retention   -Monitor PVRs, SC prn >300cc     Bowel   -High risk constipation   -PRN miralax, MoM, and bisacodyl supp. Safety   -fall and aspiration precautions     Pain control  -acetaminophen prn     DVT ppx  -lovenox    Rehab Progress: Making progress. Working on functional mobility, balance, compensatory strategies, cognition. Barriers include: right hemiparesis, R VF impairment, recent right TKA, higher level cognition. Anticipated Dispo: home alone, prn assist from daughters  Services: TBD  DME: TBD  ELOS: 10-14 days      Sloane Rodriguez MD 7/31/2023, 6:17 PM

## 2023-07-31 NOTE — PROGRESS NOTES
Occupational Therapy  Facility/Department: [facility]  Rehabilitation Occupational Therapy Daily Treatment Note    Date: 23  Patient Name: Bertina Frankel       Room: U5T-3107/0523-32  MRN: [id number]  Account: [de-identified]   : 1955  (74 y.o.) Gender: female            PM session: met in therapy dept she just finished PT session. Pt is agreeable for simulated tub/shower using TTB (owns one at home + GBs @ tub area). Pt needed close SB/CGA for transfer, pt somewhat unsteady using cane, tactile cues for steadiness in & out of bathrm. Discussed her medications at home and concerns about being \"pre-diabetic\"--aware she can make changes to her diet including less carbs, more protein. Pt ambulated to her room & had to use toilet, required close SB/CGA using cane; hangs it on the GB to transfer onto toilet, able to manage clothing & wiping w/ close Sb/CGA. Mild unsteadiness when ambulating over to sink & out of bathrm to recliner. Pt left in recliner, call light in reach, chair alarm on; SW present in room to interview her. Tx time: 45 min, cont w/ POC. Mindi Lee, OTR/L #[id number]         Past Medical History:  has a past medical history of Hepatitis and Hypertension. Past Surgical History:   has a past surgical history that includes Hysterectomy; tumor excision; and knee surgery (Right, ).     Restrictions  Restrictions/Precautions: Fall Risk  Other position/activity restrictions: R field cut, (-) c-diff   Equipment Used: Bed, Wheelchair    Subjective  Subjective: met in therapy dept, she just finished PT session  Restrictions/Precautions: Fall Risk             Objective  Vision - Basic Assessment  Patient Visual Report: Balance difficulty      Perception  Overall Perceptual Status: Impaired     ADL                 OT Exercises  Exercise Treatment: using 2 lb wts for strengthening including bicep curls, ER/IR, sup/pronation, chest presses for 1 set of 10-15; R hand slightly ataxic  Postural

## 2023-07-31 NOTE — PROGRESS NOTES
Curtain  Bathroom Toilet: Standard  Bathroom Equipment: Grab bars in shower, Shower chair, Hand-held shower  Bathroom Accessibility: Accessible  Home Equipment: Walker, rolling, Cane, Grab bars  Has the patient had two or more falls in the past year or any fall with injury in the past year?: No  Receives Help From: Family  ADL Assistance: Independent  Homemaking Assistance: Independent  Ambulation Assistance: Independent (with use of walker majority of the time, did occassionaly ambulate in apartment without a device)  Transfer Assistance: Independent  Active : No  Patient's  Info: Daughters will drive her to and from the grocery store or patient would take the bus prior to her TKA in June  Mode of Transportation: Bus, Car  Occupation: Retired  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 Pt uses medical transportation for doctor appointments. Additional Comments: sleeps in bed & on couch      OBJECTIVE       Functional Mobility  Bed Mobility  Overall Assistance Level: Modified Independent  Sit to Supine  Assistance Level: Modified independent  Supine to Sit  Assistance Level: Modified independent  Scooting  Assistance Level: Modified independent    Balance  Sitting Balance: Modified independent   Standing Balance: Stand by assistance (with and without UE support)    Transfers  Surface: Wheelchair; To mat;From mat  Additional Factors: Increased time to complete;Hand placement cues  Device: Cane;Walker  Sit to Stand  Assistance Level: Stand by assist;Contact guard assist  Skilled Clinical Factors: verbal cues for hand placement required when using walker as patient consistently pulls on the walker. Improved hand placement when using the cane and able to manage the cane during transfer.   Stand to

## 2023-07-31 NOTE — PATIENT CARE CONFERENCE
Level: Set-up, Contact guard assist  Skilled Clinical Factors: for balance. Able to thread adult briefs and pants over feet and uses grab bar for support (after shower) to manage clothing over hips  Putting On/Taking Off Footwear  Assistance Level: Stand by assist  Skilled Clinical Factors: Able to doff and don bilat socks while seated by crossing feet over opposite knees  Toileting  Assistance Level: Contact guard assist, Stand by assist  Skilled Clinical Factors: Using grab bar and RW. Transfers: Toilet Transfers  Equipment: Grab bars (comfort height toilet)  Additional Factors: Verbal cues, Cues for hand placement  Assistance Level: Contact guard assist  Skilled Clinical Factors: Repeated cues to back up all the way to the toilet (still sitting prematurely) and for hand placement. Tub/Shower Transfers  Type: Shower  Transfer From: [place] Alan To: Shower chair with back  Additional Factors: Verbal cues, Cues for hand placement, Increased time to complete  Assistance Level: Contact guard assist  Skilled Clinical Factors: She bumps the RW wheel on the right side 3 times into the shower chair but does maneuver it away without verbal cues needed. Only extra time. Cues for hand placement    IADLs:  Meal Prep     Money Management     [facility]       UE function:  R sided mild ataxia    Assessment:  Assessment: Pt is a 71 yo female admitted to [address] [place] [address] unit on  7/28/23 with MRI revealing multifocal small acute to subacute infarcts highly suggestive of cardioembolic etiology. Pt with history of R knee replacement 6/7/2023, discharged to [facility] 6/12-6/23 and returned home. She lived alone independently in an apartment PTA and is currently below baseline. Today she participated in visual screening and R hand function using dynamometer & 9 hole peg. Eye chart is 20/25 in each eye; vision disk: 85* in both eyes.  She

## 2023-07-31 NOTE — PROGRESS NOTES
Pt sitting on edge of bed independently requesting to use the BR. Pt up fromsit to stand with CGA and GB and walker. Pt ambulates to BR with CGA. Toilets with CGA and back to bed and CGA. Pt lying on R side. Call light in reach. Bed alarm on.

## 2023-07-31 NOTE — PROGRESS NOTES
Patient admitted to rehab with CVA. A/Ox4. Transfers with walker, gaitbelt. Mobility restrictions: WBAT. On regular 4 carb diet diet, tolerating well. Medications taken whole with thins. On plavix, ASA, Lovenox for DVT prophylaxis. Skin: intact. Oxygen: RA. LDA: None. Has been continent of bowel and continent of bladder. LBM 7/31. Chair/bed alarms in use and call light in reach. Will monitor for safety.  Electronically signed by Lilian Dean RN on 7/31/2023 at 9:03 AM

## 2023-07-31 NOTE — PLAN OF CARE
Problem: Discharge Planning  Goal: Discharge to home or other facility with appropriate resources  Outcome: Progressing     Problem: Safety - Adult  Goal: Free from fall injury  Outcome: Progressing  Flowsheets (Taken 7/31/2023 0837)  Free From Fall Injury: Instruct family/caregiver on patient safety     Problem: ABCDS Injury Assessment  Goal: Absence of physical injury  Outcome: Progressing  Flowsheets (Taken 7/31/2023 0837)  Absence of Physical Injury: Implement safety measures based on patient assessment     Problem: Nutrition Deficit:  Goal: Optimize nutritional status  Outcome: Progressing     Problem: Neurosensory - Adult  Goal: Achieves stable or improved neurological status  Outcome: Progressing     Problem: Skin/Tissue Integrity - Adult  Goal: Skin integrity remains intact  Outcome: Progressing  Flowsheets (Taken 7/31/2023 0837)  Skin Integrity Remains Intact: Monitor for areas of redness and/or skin breakdown     Problem: Skin/Tissue Integrity - Adult  Goal: Incisions, wounds, or drain sites healing without S/S of infection  Outcome: Progressing  Flowsheets (Taken 7/31/2023 0837)  Incisions, Wounds, or Drain Sites Healing Without Sign and Symptoms of Infection:   TWICE DAILY: Assess and document skin integrity   TWICE DAILY: Assess and document dressing/incision, wound bed, drain sites and surrounding tissue     Problem: Musculoskeletal - Adult  Goal: Return mobility to safest level of function  Outcome: Progressing     Problem: Musculoskeletal - Adult  Goal: Maintain proper alignment of affected body part  Outcome: Progressing     Problem: Infection - Adult  Goal: Absence of infection during hospitalization  Outcome: Progressing     Problem: Metabolic/Fluid and Electrolytes - Adult  Goal: Electrolytes maintained within normal limits  Outcome: Progressing

## 2023-07-31 NOTE — PROGRESS NOTES
Pt awake and AAO lying in bed watching TV. Pt denies pain. Pt admitted to ARU following CVA with R robbie. Speech clear. No dysphagia. Slight R sided weakness. Lungs clear and decreased. No sob or cough. On RA. Belly round and soft with active BS. LBM today. Pt denies any diarrhea today. No edema noted. On telemetry, SR to [address] [employer]. Call light in reach. Bed alarm on.

## 2023-07-31 NOTE — PROGRESS NOTES
ACUTE REHAB UNIT  SPEECH/LANGUAGE PATHOLOGY      [x] Daily  [x] Weekly Care Conference Note  [] Discharge    Santos Orellana      :1955  NVA:[id number]  Rehab Dx/Hx: Acute ischemic stroke ([address] ) [I63.9]    Precautions: Radha situation: Lives alone; does have support  ST Dx: [] Aphasia  [] Dysarthria  [] Apraxia   [] Oropharyngeal dysphagia [] Cognitive   Impairment  [] Other:   Initial Speech Therapy Assessment Diagnosis:   Speech Therapy Diagnosis  1. Cognitive Diagnosis: Minimal to moderate deficits in domains of higher level attention; working memory and STM; VPS/reasoning and numeric reasoning. Pt with voiced changes in vision which can further exacerbate function. Variable breakdown wtih complex/abstract information. Will initiate therapy as pt lives alone and does participate in own apt management/med management/ banking management and current deficits could impact. 2. Speech Diagnosis: Audible and intelligible connected speech. pt with slight lingual asym and coordination defivicits which does not impact intelligibiilty  3. Communication Diagnosis: Pt demonstrating functional concrete receptive language processing. Pt does need extra time and variable level of assist with complex processing. Ongogn assessment of visual scanning and visual org for varied functional visual language tasks. Verbal expression was functional on testing for CFN ; concept/event descriptions. Breakdown with abstract verbal reasoning. 4. Dysphagia Diagnosis: Swallow function appears [facility]  Date of Admit: 2023  Room #: T6X-1250/3257-01  Date: 2023       Current functional status (updated daily):         Current Diet Order:ADULT DIET;  Regular; 4 carb choices (60 gm/meal)   Behavior: [x] Alert  [x] Cooperative  [x]  Pleasant  [] Confused  [] Agitated  [] Uncooperative  [] Distractible [] Motivated  [] Self-Limiting [] Anxious  [] Other:  Endurance:  [x] Adequate for participation in SLP sessions  [] Reduced

## 2023-08-01 ENCOUNTER — APPOINTMENT (OUTPATIENT)
Dept: PHYSICAL THERAPY | Age: 68
End: 2023-08-01
Payer: MEDICARE

## 2023-08-01 PROCEDURE — 94760 N-INVAS EAR/PLS OXIMETRY 1: CPT

## 2023-08-01 PROCEDURE — 6370000000 HC RX 637 (ALT 250 FOR IP): Performed by: PHYSICAL MEDICINE & REHABILITATION

## 2023-08-01 PROCEDURE — 97530 THERAPEUTIC ACTIVITIES: CPT

## 2023-08-01 PROCEDURE — 97110 THERAPEUTIC EXERCISES: CPT

## 2023-08-01 PROCEDURE — 1280000000 HC REHAB R&B

## 2023-08-01 PROCEDURE — 6360000002 HC RX W HCPCS: Performed by: PHYSICAL MEDICINE & REHABILITATION

## 2023-08-01 PROCEDURE — 97535 SELF CARE MNGMENT TRAINING: CPT

## 2023-08-01 PROCEDURE — 97129 THER IVNTJ 1ST 15 MIN: CPT

## 2023-08-01 PROCEDURE — 97130 THER IVNTJ EA ADDL 15 MIN: CPT

## 2023-08-01 PROCEDURE — 97116 GAIT TRAINING THERAPY: CPT

## 2023-08-01 RX ADMIN — ENOXAPARIN SODIUM 40 MG: 100 INJECTION SUBCUTANEOUS at 07:23

## 2023-08-01 RX ADMIN — LOSARTAN POTASSIUM 50 MG: 50 TABLET, FILM COATED ORAL at 07:23

## 2023-08-01 RX ADMIN — Medication 3 MG: at 20:05

## 2023-08-01 RX ADMIN — ASPIRIN 81 MG: 81 TABLET, COATED ORAL at 07:24

## 2023-08-01 RX ADMIN — ATORVASTATIN CALCIUM 80 MG: 80 TABLET, FILM COATED ORAL at 20:05

## 2023-08-01 RX ADMIN — CLOPIDOGREL BISULFATE 75 MG: 75 TABLET ORAL at 07:23

## 2023-08-01 RX ADMIN — CHLORTHALIDONE 25 MG: 25 TABLET ORAL at 07:24

## 2023-08-01 NOTE — PLAN OF CARE
Problem: Discharge Planning  Goal: Discharge to home or other facility with appropriate resources  Outcome: Progressing  Flowsheets (Taken 8/1/2023 0022)  Discharge to home or other facility with appropriate resources:   Identify barriers to discharge with patient and caregiver   Identify discharge learning needs (meds, wound care, etc)     Problem: Safety - Adult  Goal: Free from fall injury  Outcome: Progressing  Note: Fall risk band on patient. Orange light on outside of room. Non skid footwear in place. Alarms used appropriately. Patient instructed to call and wait for staff before getting up. Rounding done to anticipate needs. Appropriate safety devices used for transfers.        Problem: ABCDS Injury Assessment  Goal: Absence of physical injury  Outcome: Progressing  Flowsheets (Taken 8/1/2023 0022)  Absence of Physical Injury: Implement safety measures based on patient assessment     Problem: Skin/Tissue Integrity - Adult  Goal: Skin integrity remains intact  Outcome: Progressing     Problem: Musculoskeletal - Adult  Goal: Return mobility to safest level of function  Outcome: Progressing  Flowsheets (Taken 8/1/2023 0022)  Return Mobility to Safest Level of Function:   Assess patient stability and activity tolerance for standing, transferring and ambulating with or without assistive devices   Assist with transfers and ambulation using safe patient handling equipment as needed     Problem: Metabolic/Fluid and Electrolytes - Adult  Goal: Electrolytes maintained within normal limits  Outcome: Progressing  Flowsheets (Taken 8/1/2023 0022)  Electrolytes maintained within normal limits: Monitor labs and assess patient for signs and symptoms of electrolyte imbalances

## 2023-08-01 NOTE — PROGRESS NOTES
Physical Therapy  Facility/Department: [address] IP REHAB  Daily Treatment Note  NAME: Dre Earl  : 1955  MRN: [id number]    Date of Service: 2023    Discharge Recommendations:  Patient would benefit from continued therapy after discharge, Continue to assess pending progress   PT Equipment Recommendations  Equipment Needed: No    Patient Diagnosis(es): There were no encounter diagnoses. Assessment   Assessment: Ms. Dre Earl reports continues to show improvements daily. She was able to ambulate community distance with [address] this date SBA, and is having improved fluidity and movement in right LE. Patient needs occasional encouragement to increase distance with mobility. She was also able to complete 8 stairs with one rail and cane in other hand with SBA. Patient demonstrated good balance while performing ball toss exercises with no UE support. Patient continues to be below baseline and will benefit from continued skilled therapy for strengthening, balance training, and neuromuscular re-education to allow for safe return home. Activity Tolerance: Patient tolerated treatment well  Equipment Needed: No     Plan    Physcial Therapy Plan  General Plan:  minutes of therapy at least 5 out of 7 days a week  Days Per Week: 5 Days  Therapy Duration: 10 Days  Current Treatment Recommendations: Functional mobility training;Transfer training;Balance training;Gait training;Strengthening; Endurance training;Stair training; Safety education & training;Home exercise program;Neuromuscular re-education; Therapeutic activities     Restrictions  Restrictions/Precautions  Restrictions/Precautions: Fall Risk  Position Activity Restriction  Other position/activity restrictions: R field cut, (-) c-diff      Subjective    Subjective  Subjective: Pt arrived in [facility] for today's session.  Reports that she is awake and ready for therapy despite not getting a full night's sleep  Pain: Denies c/o pain  Orientation  Overall

## 2023-08-01 NOTE — PROGRESS NOTES
Patient admitted to rehab following CVA w/ R robbie. A/Ox4. Transfers with cane and gait belt. Mobility restrictions: wbat. On 5CC diet, tolerating well. Medications taken whole w/thins. On Lovenox for DVT prophylaxis. Skin: intact. Oxygen: RA. Has been continent of bowel and bladder. LBM 7/31/23. No complaints from pt this shift. Pt resting quietly in bed. Respirations easy and even. Bed alarms in use and call light in reach. Will monitor for safety.

## 2023-08-01 NOTE — PROGRESS NOTES
overall  [] Lethargic  [] Other:  Safety: [] No concerns at this time  [x] Reduced insight into deficits  []  Reduced safety awareness [] Not following call light procedures  [] Unable to Assess  [] Other:  Swallowing Precautions: Sit up for all meals and thereafter for 30 minutes  Barriers toward progress: none unless caregiver support           Date: 8/1/2023      Tx session 1 Tx session 2   Total Timed Code Min SLP Individual Minutes  Time In: 1020  Time Out: 1100  Minutes:40  Coded treatment time  40  N/a   Group Treatment Minutes 0 0   Co-Treat Minutes 0 0   Variance/Reason:  N/a    Pain denied    Pain Intervention [] RN notified  [] Repositioned  [] Intervention offered and patient declined  [] N/A  [] Other: [] RN notified  [] Repositioned  [] Intervention offered and patient declined  [] N/A  [] Other:   Subjective     Seen in treatment session  Good effort in therapy      Objective:  Goals       Dysphagia Goals  Continue regular diet    Goal met    Dysphagia Goals:  The patient will tolerate recommended diet without observed clinical signs of aspiration D/c goal N/a     Cognitive-communication Goals: Pt goal is to return home; care for self and resume baseline responsibilities; walk   Therapy working toward pt goal N/a     Goal 1: Pt will demosntrate improved recall of daily events; learned safety strategies; new information with compensatory strategies PRN with set up and intermittent assist   Working Memory for organizational task using written instructions: :   Extra time  Re-reading or use of underlining key words/phrases  Use of writing reminder notes N/a   Goal 2: Pt will demonstrate functional VPS/PS/numeric reasoning across graded tasks requiring alternating/divided attention wtih use of compensatory strategies PRN and <mild assist PS/VPS  Numeric reasoning:   Time measurements targeted during PS tasks: 0 to mild assist    PS/reasoning using both concrete and complex (making inferences) from written

## 2023-08-01 NOTE — PLAN OF CARE
Problem: Discharge Planning  Goal: Discharge to home or other facility with appropriate resources  8/1/2023 0907 by Carlos Magana RN  Outcome: Progressing     Problem: Safety - Adult  Goal: Free from fall injury  8/1/2023 0907 by Carlos Magana RN  Outcome: Progressing  Flowsheets (Taken 8/1/2023 0905)  Free From Fall Injury: Instruct family/caregiver on patient safety     Problem: ABCDS Injury Assessment  Goal: Absence of physical injury  8/1/2023 0907 by Carlos Magana RN  Outcome: Progressing     Problem: Nutrition Deficit:  Goal: Optimize nutritional status  Outcome: Progressing     Problem: Neurosensory - Adult  Goal: Achieves stable or improved neurological status  Outcome: Progressing     Problem: Skin/Tissue Integrity - Adult  Goal: Skin integrity remains intact  8/1/2023 0907 by Carlos Magana RN  Outcome: Progressing     Problem: Skin/Tissue Integrity - Adult  Goal: Incisions, wounds, or drain sites healing without S/S of infection  Outcome: Progressing     Problem: Musculoskeletal - Adult  Goal: Return mobility to safest level of function  8/1/2023 0907 by Carlos Magana RN  Outcome: Progressing     Problem: Musculoskeletal - Adult  Goal: Maintain proper alignment of affected body part  Outcome: Progressing     Problem: Infection - Adult  Goal: Absence of infection during hospitalization  Outcome: Progressing     Problem: Metabolic/Fluid and Electrolytes - Adult  Goal: Electrolytes maintained within normal limits  8/1/2023 0907 by Carlos Magana RN  Outcome: Progressing

## 2023-08-01 NOTE — PROGRESS NOTES
she did not get to take a nap earlier like she usually does. Sit<>stand with supervision. Patient ambulated back to room with single point cane with supervision. Step height decreasing secondary to fatigue, but able to maintain balance despite scuffing heels. Assessment: Patient reporting increased fatigue. She has some decreased safety with increased fatigue, but no LOB occurred.        Safety Device - Type of devices:  [x]  All fall risk precautions in place [] Bed alarm in place  [x] Call light within reach [x] Chair alarm in place [] Positioning belt [x] Gait belt [] Patient at risk for falls [] Left in bed [x] Left in chair [] Telesitter in use [] Sitter present [] Nurse notified []  None      Therapy Time   Individual Co-treatment   Time In 1400     Time Out 1440     Minutes 40         Electronically signed by Royce Smith PT on 8/1/2023 at 2:40 PM

## 2023-08-01 NOTE — PROGRESS NOTES
Physical Therapy  Facility/Department: [facility]  REHAB  Rehabilitation Physical Therapy Treatment Note    NAME: Rickey Rosario  : 1955 (27 y.o.)  MRN: [id number]  CODE STATUS: Full Code    Date of Service: 23       Restrictions:  Restrictions/Precautions: Fall Risk  Position Activity Restriction  Other position/activity restrictions: R field cut, (-) c-diff      SUBJECTIVE  Subjective  Subjective: Pt arrived in [facility] for today's session. Reports that she is awake and ready for therapy despite not getting a full night's sleep  Pain: Denies c/o pain       OBJECTIVE  Cognition  Overall Cognitive Status: Exceptions  Following Commands: Follows multistep commands with increased time; Follows multistep commands with repitition  Orientation  Overall Orientation Status: Within Functional Limits    Functional Mobility  Balance  Sitting Balance: Modified independent   Standing Balance: Stand by assistance  Standing Balance  Time: 3 min  Activity: Pt tossed beach ball with a volunteer with no UE support and no LOB, SBA  Transfers  Surface: To chair with arms;From chair with arms; Wheelchair  Additional Factors: Increased time to complete  Device: [employer] to [employer] Level: Stand by assist  Stand to Sit  Assistance Level: Stand by assist      Environmental Mobility  Ambulation  Surface: Level surface  Device: Cane (Pt requested to use SPC, does not want to use RW)  Distance: 220' x 2, 230'  Activity: Within Unit  Activity Comments: Pt requiring occassional encouragement to complete amb distances  Additional Factors: Increased time to complete  Assistance Level: Stand by assist  Gait Deviations: Decreased heel strike right;Narrow base of support;Path deviations  Skilled Clinical Factors: Pt with one minor LOB to L laterally, able to self correct.  Pt slightly unsteady at times with [facility], but generally does well when using SPC    Stairs  Stair Height: 6''  Device: Cane;Bilateral handrails (R rail

## 2023-08-01 NOTE — PROGRESS NOTES
Patient admitted to rehab with CVA. A/Ox4. Transfers with cane, gaitbelt x1. Mobility restrictions: WBAT. On regular 5 carb diet, tolerating well. Medications taken whole with thins. On plavis, ASA, Lovenox for DVT prophylaxis. Skin: intact. Oxygen: RA. LDA: none. Has been continent of bowel and continent of bladder. LBM 7/31. Chair/bed alarms in use and call light in reach. Will monitor for safety.  Electronically signed by Sekou Krishnan RN on 8/1/2023 at 9:11 AM

## 2023-08-01 NOTE — PROGRESS NOTES
Occupational Therapy  Facility/Department: [facility]  REHAB  Rehabilitation Occupational Therapy Daily Treatment Note    Date: 23  Patient Name: Elsi Holcomb       Room: X4K-2757/4869-92  MRN: [id number]  Account: [de-identified]   : 1955  (74 y.o.) Gender: female         Past Medical History:  has a past medical history of Hepatitis and Hypertension. Past Surgical History:   has a past surgical history that includes Hysterectomy; tumor excision; and knee surgery (Right, ). Restrictions  Restrictions/Precautions: Fall Risk  Other position/activity restrictions: R field cut, (-) c-diff   Equipment Used: Bed, Wheelchair    Subjective  Subjective: Patient seated upright in bed upon arrival to room. Patient agreeable to shower  Restrictions/Precautions: Fall Risk       Objective     Cognition  Overall Cognitive Status: Exceptions  Following Commands: Follows multistep commands with increased time; Follows multistep commands with repitition  Orientation  Overall Orientation Status: Within Functional Limits         ADL  Grooming/Oral Hygiene  Assistance Level: Modified independent  Skilled Clinical Factors: seated to complete oral care and comb hair  Upper Extremity Bathing  Assistance Level: Supervision;Set-up  Skilled Clinical Factors: while seated on shower chair with back,  Very thorough. Set-up to manage wires from heart monitor. Able to manage water on/off and [facility]  Lower Extremity Bathing  Assistance Level: Contact guard assist;Stand by assist  Skilled Clinical Factors: for balance in standing with use of grab bars, crossed LE's to wash feet  Upper Extremity Dressing  Assistance Level: Set-up  Skilled Clinical Factors: while seated to doff and don pull-over shirt and bra  Lower Extremity Dressing  Assistance Level: Stand by assist;Contact guard assist  Skilled Clinical Factors: for balance.   Able to thread adult briefs and pants over feet SBA/CGA to manage clothing over hips  Putting On/Taking

## 2023-08-01 NOTE — PROGRESS NOTES
Occupational Therapy  OCCUPATIONAL THERAPY  Progress Note   Second Session    Patient Name: Brandee Olmos  Medical Record Number: [id number]    Treatment Diagnosis: impaired strength, coordination, and vision following acute ischemic stroke    General  Chart Reviewed: Yes, Orders, Progress Notes, Labs  Additional Pertinent Hx: 70 y.o. female who presented with 2 days of right side weakness. MRI revealed multifocal small acute to subacute infarcts highly suggestive of cardioembolic etiology. Pt with history of R knee replacement 6/7/2023, discharged to [facility] 6/12-6/23 and returned home. Family / Caregiver Present: No  Referring Practitioner: Dr Sukhdeep Dias  Diagnosis: CVA     Restrictions/Precautions  Restrictions/Precautions: Fall Risk        Position Activity Restriction  Other position/activity restrictions: R field cut, (-) c-diff 7/29    Subjective: Met in therapy dept, she denies pain; reports R knee 3/10--reports shooting pains during the night, discussed appropriate meds to reduce this symptom including Gabapentin, Flexeril    Objective: agreeable for visual scanning, [facility] tasks    Assessment: Pt completed visual scanning/card matching tasks w/ 100% accuracy, needed extra time to scan to middle & R UQ but able to match all cards. She Tried to turn the [facility] to the [address] in order to place pieces into correct slots; OT repositioned the board in the middle & asked to her use R hand. Needed extra time but able to match 95% of the shape pieces. She completed the leather lacing portion of ACLS--able to complete the running & whip stitch IND'ly without demo, given 2 demo's of single cordovan, pt dropped the lacing 2-3 times but able to recover without getting frustrated--her score of 5.2 indicates she may live alone w/ wkly checks to monitor safety & to examine potentially dangerous effects of impulsive behavior. Pt taken to PT side of gym at end of session. Tx time: 45 min, cont w/ POC    Safety

## 2023-08-01 NOTE — DISCHARGE INSTR - COC
[id number]  Phone:  [phone]  Fax:  [phone]      / signature: Electronically signed by CARLOS Honeycutt on 8/1/23 at 2:58 PM EDT    PHYSICIAN SECTION    Prognosis: Good    Condition at Discharge: Stable    Rehab Potential (if transferring to Rehab): Good    Recommended Labs or Other Treatments After Discharge:   Home care PT, OT, SLP, RN  Follow-up with PCP, Neurology, Ortho  30 day cardiac event monitor recommended per Neurology  Recommend work-up for hyperparathyroidism. Physician Certification: I certify the above information and transfer of Jessica Molina  is necessary for the continuing treatment of the diagnosis listed and that she requires [address] [place] [address] for less 30 days. Update Admission H&P: No change in H&P    PHYSICIAN SIGNATURE:  Electronically signed by Lili Unger.  Kristen Rolon MD 8/4/2023, 10:00 AM

## 2023-08-02 PROCEDURE — 97530 THERAPEUTIC ACTIVITIES: CPT

## 2023-08-02 PROCEDURE — 97129 THER IVNTJ 1ST 15 MIN: CPT

## 2023-08-02 PROCEDURE — 94760 N-INVAS EAR/PLS OXIMETRY 1: CPT

## 2023-08-02 PROCEDURE — 6370000000 HC RX 637 (ALT 250 FOR IP): Performed by: PHYSICAL MEDICINE & REHABILITATION

## 2023-08-02 PROCEDURE — 6360000002 HC RX W HCPCS: Performed by: PHYSICAL MEDICINE & REHABILITATION

## 2023-08-02 PROCEDURE — 97110 THERAPEUTIC EXERCISES: CPT

## 2023-08-02 PROCEDURE — 97116 GAIT TRAINING THERAPY: CPT

## 2023-08-02 PROCEDURE — 97130 THER IVNTJ EA ADDL 15 MIN: CPT

## 2023-08-02 PROCEDURE — 97112 NEUROMUSCULAR REEDUCATION: CPT

## 2023-08-02 PROCEDURE — 1280000000 HC REHAB R&B

## 2023-08-02 RX ADMIN — Medication 3 MG: at 20:02

## 2023-08-02 RX ADMIN — LOSARTAN POTASSIUM 50 MG: 50 TABLET, FILM COATED ORAL at 07:47

## 2023-08-02 RX ADMIN — CHLORTHALIDONE 25 MG: 25 TABLET ORAL at 07:48

## 2023-08-02 RX ADMIN — ENOXAPARIN SODIUM 40 MG: 100 INJECTION SUBCUTANEOUS at 07:48

## 2023-08-02 RX ADMIN — ATORVASTATIN CALCIUM 80 MG: 80 TABLET, FILM COATED ORAL at 20:02

## 2023-08-02 RX ADMIN — CLOPIDOGREL BISULFATE 75 MG: 75 TABLET ORAL at 07:47

## 2023-08-02 RX ADMIN — ASPIRIN 81 MG: 81 TABLET, COATED ORAL at 07:47

## 2023-08-02 NOTE — PROGRESS NOTES
DC  Long Term Goal 5: address R hand function using 9 hole peg & dynamometer, write goals accordingly--pt will increase R  x 5 lbs; decrease score on 9 hole peg x 10 seconds        Therapy Time   Individual Concurrent Group PM-treatment   Time In 0815     1315   Time Out 0900     1400   Minutes 45     [address] [place], [place] [address]

## 2023-08-02 NOTE — PROGRESS NOTES
clock: IND   Plan: Continue as per plan of care. Continued Tx Upon Discharge: ?    [] Yes [] No [x] TBD based on progress while on ARU [] Vital Stim indicated [] Other:   Estimated discharge date: TBD   Discharge recommendations:   [] Home independently  [x] Home with assistance []  24 hour supervision  [] ECF [] Other:     Additional information:     Interventions used during Rehab Stay:  [] Speech/Language Treatment  [] Instruction in HEP [] Group [] Dysphagia Treatment   [x] Cognitive Treatment   [] Other:          Electronically Signed by    Chu Leon. Chepe,MS,CCC,SLP [id number]  Speech and Language Pathologist

## 2023-08-02 NOTE — PLAN OF CARE
Problem: Safety - Adult  Goal: Free from fall injury  8/1/2023 2208 by Aftab Randhawa RN  Note:   Fall risk wrist band on the patient. Non-skid footwear in place. Bed alarms used appropriately. Patient calls appropriately for needs. Side rails up. Appropriate safety devices used for transfer. Will continue to monitor. Problem: Skin/Tissue Integrity - Adult  Goal: Skin integrity remains intact  8/1/2023 2208 by Aftab Randhawa RN  Flowsheets (Taken 8/1/2023 2000)  Skin Integrity Remains Intact: Monitor for areas of redness and/or skin breakdown  Note: Skin assessment completed on this shift. Barrier wipes used in the event of incontinence. Pressure relief techniques used as needed while  in bed. Position changes encouraged at least every two hours while in bed.

## 2023-08-02 NOTE — PROGRESS NOTES
Patient admitted to rehab with acute ischemic stroke. A/Ox4. Transfers with cane/gait belt. Mobility restrictions: WBAT; unsteady at times. On regular; 5 carb diet, tolerating well. Medications taken whole with thins. On plavix, aspirin, lovenox for DVT prophylaxis. Skin: intact. Oxygen: RA. LDA: none. Has been continent of bowel and continent of bladder. LBM 8/1/23. Chair/bed alarms in use and call light in reach. Will monitor for safety.  Electronically signed by Han Scott RN on 8/2/2023 at 10:06 AM

## 2023-08-02 NOTE — PLAN OF CARE
Problem: Discharge Planning  Goal: Discharge to home or other facility with appropriate resources  Outcome: Progressing  Flowsheets  Taken 8/2/2023 0745 by Shyann Lopez RN  Discharge to home or other facility with appropriate resources:   Identify barriers to discharge with patient and caregiver   Arrange for needed discharge resources and transportation as appropriate   Identify discharge learning needs (meds, wound care, etc)   Refer to discharge planning if patient needs post-hospital services based on physician order or complex needs related to functional status, cognitive ability or social support system      Problem: Safety - Adult  Goal: Free from fall injury  8/2/2023 0951 by Shyann Lopez RN  Outcome: Progressing  Flowsheets  Taken 8/2/2023 0950 by Shyann Lopez RN  Free From Fall Injury: Instruct family/caregiver on patient safety      Problem: ABCDS Injury Assessment  Goal: Absence of physical injury  Outcome: Progressing  Flowsheets (Taken 8/2/2023 0950)  Absence of Physical Injury: Implement safety measures based on patient assessment     Problem: Nutrition Deficit:  Goal: Optimize nutritional status  Outcome: Progressing

## 2023-08-02 NOTE — PROGRESS NOTES
Patient admitted to rehab with CVA. A/Ox4. Transfers with cane, gaitbelt x1. Mobility restrictions: WBAT. On regular 5 carb diet, tolerating well. Medications taken whole with thins. On plavis, ASA, Lovenox for DVT prophylaxis. Skin: intact. Oxygen: RA. LDA: none. Has been continent of bowel and continent of bladder. LBM 8/1. Bed alarms in use and call light in reach. Will monitor for safety.

## 2023-08-02 NOTE — PROGRESS NOTES
Physical Therapy  Facility/Department: [facility]  Rehabilitation Physical Therapy Treatment Note    NAME: Viri Escoto  : 1955 (70 y.o.)  MRN: [id number]  CODE STATUS: Full Code    Date of Service: 23       Restrictions:  Restrictions/Precautions: Fall Risk  Position Activity Restriction  Other position/activity restrictions: R field cut     Pertinent medical information:  Additional Pertinent Hx: Per Dr. Nicole Litten consult note, \"78 yo F with pmh HTN, hepatitis, and recent right total knee arthroplasty (2023) who initially presented 2023 with right side weakness. CT head negative for hemorrhage but showed possible recent ischemic change in the posterior left occipital lobe. CTA head/neck showed moderate focal narrowing of the right supraclinoid ICA. MRI brain showed acute to subacute small cortical infarcts throughout the left parietal and occipital lobes and bilateral frontal lobes. Neurology has been consulted. Concern for cardioembolic etiology. Recommending DAPT x 21 days, statin, event monitor. Course complicated by HTN, hyperglycemia, hypercalcemia. Currently, patient reports ongoing but improving right side weakness and difficulty with coordination of the right hand. She has some abnormal sensation/numbness in the distal RLE. She has noted short term memory difficulty. Also with right side vision impairment. She denies headache. \"    SUBJECTIVE  Subjective  Subjective: patient arrived in wheelchair following speech session. She reports she didn't sleep well. She states she keeps waking up and feeling hungry.   Pain: No complaints of pain at this time    Social/Functional History  Lives With: Alone (Pet dog named Manasa (miniature pincher))  Type of Home: Apartment  Home Layout: One level, Laundry in basement  Home Access: Stairs to enter with rails  Entrance Stairs - Number of Steps: 4 steps to enter with Leo railings in front; 2 IONA with rail in back  Entrance Stairs - Rails:

## 2023-08-03 LAB
ANION GAP SERPL CALCULATED.3IONS-SCNC: 8 MMOL/L (ref 3–16)
BASOPHILS # BLD: 0.1 K/UL (ref 0–0.2)
BASOPHILS NFR BLD: 1.1 %
BUN SERPL-MCNC: 16 MG/DL (ref 7–20)
CALCIUM SERPL-MCNC: 10.6 MG/DL (ref 8.3–10.6)
CHLORIDE SERPL-SCNC: 99 MMOL/L (ref 99–110)
CO2 SERPL-SCNC: 23 MMOL/L (ref 21–32)
CREAT SERPL-MCNC: 1.1 MG/DL (ref 0.6–1.2)
DEPRECATED RDW RBC AUTO: 15.9 % (ref 12.4–15.4)
EOSINOPHIL # BLD: 0.2 K/UL (ref 0–0.6)
EOSINOPHIL NFR BLD: 2.7 %
GFR SERPLBLD CREATININE-BSD FMLA CKD-EPI: 54 ML/MIN/{1.73_M2}
GLUCOSE SERPL-MCNC: 120 MG/DL (ref 70–99)
HCT VFR BLD AUTO: 38.7 % (ref 36–48)
HGB BLD-MCNC: 12 G/DL (ref 12–16)
LYMPHOCYTES # BLD: 3.9 K/UL (ref 1–5.1)
LYMPHOCYTES NFR BLD: 53.6 %
MCH RBC QN AUTO: 24.3 PG (ref 26–34)
MCHC RBC AUTO-ENTMCNC: 30.9 G/DL (ref 31–36)
MCV RBC AUTO: 78.6 FL (ref 80–100)
MONOCYTES # BLD: 0.6 K/UL (ref 0–1.3)
MONOCYTES NFR BLD: 8.7 %
NEUTROPHILS # BLD: 2.5 K/UL (ref 1.7–7.7)
NEUTROPHILS NFR BLD: 33.9 %
PLATELET # BLD AUTO: 373 K/UL (ref 135–450)
PMV BLD AUTO: 8 FL (ref 5–10.5)
POTASSIUM SERPL-SCNC: 4.5 MMOL/L (ref 3.5–5.1)
RBC # BLD AUTO: 4.93 M/UL (ref 4–5.2)
SODIUM SERPL-SCNC: 130 MMOL/L (ref 136–145)
WBC # BLD AUTO: 7.3 K/UL (ref 4–11)

## 2023-08-03 PROCEDURE — 85025 COMPLETE CBC W/AUTO DIFF WBC: CPT

## 2023-08-03 PROCEDURE — 97129 THER IVNTJ 1ST 15 MIN: CPT

## 2023-08-03 PROCEDURE — 6360000002 HC RX W HCPCS: Performed by: PHYSICAL MEDICINE & REHABILITATION

## 2023-08-03 PROCEDURE — 6370000000 HC RX 637 (ALT 250 FOR IP): Performed by: PHYSICAL MEDICINE & REHABILITATION

## 2023-08-03 PROCEDURE — 97110 THERAPEUTIC EXERCISES: CPT

## 2023-08-03 PROCEDURE — 97130 THER IVNTJ EA ADDL 15 MIN: CPT

## 2023-08-03 PROCEDURE — 94760 N-INVAS EAR/PLS OXIMETRY 1: CPT

## 2023-08-03 PROCEDURE — 97535 SELF CARE MNGMENT TRAINING: CPT

## 2023-08-03 PROCEDURE — 80048 BASIC METABOLIC PNL TOTAL CA: CPT

## 2023-08-03 PROCEDURE — 36415 COLL VENOUS BLD VENIPUNCTURE: CPT

## 2023-08-03 PROCEDURE — 1280000000 HC REHAB R&B

## 2023-08-03 PROCEDURE — 97530 THERAPEUTIC ACTIVITIES: CPT

## 2023-08-03 PROCEDURE — 97116 GAIT TRAINING THERAPY: CPT

## 2023-08-03 RX ADMIN — ENOXAPARIN SODIUM 40 MG: 100 INJECTION SUBCUTANEOUS at 07:30

## 2023-08-03 RX ADMIN — CLOPIDOGREL BISULFATE 75 MG: 75 TABLET ORAL at 07:31

## 2023-08-03 RX ADMIN — Medication 3 MG: at 21:01

## 2023-08-03 RX ADMIN — CHLORTHALIDONE 25 MG: 25 TABLET ORAL at 07:30

## 2023-08-03 RX ADMIN — ATORVASTATIN CALCIUM 80 MG: 80 TABLET, FILM COATED ORAL at 20:57

## 2023-08-03 RX ADMIN — ASPIRIN 81 MG: 81 TABLET, COATED ORAL at 07:31

## 2023-08-03 RX ADMIN — LOSARTAN POTASSIUM 50 MG: 50 TABLET, FILM COATED ORAL at 07:30

## 2023-08-03 NOTE — PROGRESS NOTES
Occupational Therapy  Facility/Department: [facility]  Rehabilitation Occupational Therapy Daily Treatment Note    Date: 8/3/23  Patient Name: Dre Earl       Room: D0E-8624/6928-03  MRN: [id number]  Account: [de-identified]   : 1955  (74 y.o.) Gender: female         PM session: met in therapy dept, she just finished PT session. She did not have any questions from the morning session. Pt agreeable for UE strengthening and stretches--she completed 1 set of 20 for shld shrugs & abdominal squeezes. She tolerated 1 set of 10 for 9 lb gripper. Pt using 2 lb wts for 1 set of 15 for bicep curls, ER/IR, sup/pronation, chest & overhead presses. Pt able to use cane thru kitchen area then placed it on counter in order to retrieve bowl & soup can. She was able to pour soup into bowl & place into microwave; able to heat up soup x 1 minute. Retrieved soup out of microwave & place onto counter. No LOB during kitchen or room mobility using cane. Tx time: 45 min, cont w/ POC. Kimberly Alejandra, OTR/L #[id number]            Past Medical History:  has a past medical history of Hepatitis and Hypertension. Past Surgical History:   has a past surgical history that includes Hysterectomy; tumor excision; and knee surgery (Right, ).     Restrictions  Restrictions/Precautions: Fall Risk  Other position/activity restrictions: R field cut  Equipment Used: Bed, Wheelchair    Subjective  Subjective: met in room her daughter Micha Dove is present family education; pt seated on EOB  Restrictions/Precautions: Fall Risk             Objective     Cognition  Overall Cognitive Status: Exceptions  Attention Span: Difficulty dividing attention  Safety Judgement: Decreased awareness of need for assistance  Problem Solving: Assistance required to identify errors made  Insights: Decreased awareness of deficits  Initiation: Requires cues for some  Cognition Comment: needed cues to manage [facility] and water temp; is @ 95% accurate w/ visual scanning tasks

## 2023-08-03 NOTE — PROGRESS NOTES
Patient admitted to rehab with acute ischemic stroke. A/Ox4. Transfers with cane + gait belt. Mobility restrictions: WBAT; unsteady at times. On regular; 5 carb diet, tolerating well. Medications taken whole with thins. On plavix, aspirin, lovenox for DVT prophylaxis. Skin: intact. Oxygen: RA. LDA: none. Has been continent of bowel and continent of bladder. LBM 8/1/23. Chair/bed alarms in use and call light in reach. Will monitor for safety.  Electronically signed by Tavo Aguilar RN on 8/3/2023 at 9:16 AM

## 2023-08-03 NOTE — PLAN OF CARE
Problem: Discharge Planning  Goal: Discharge to home or other facility with appropriate resources  8/3/2023 0808 by Tavo Aguilar RN  Outcome: Progressing  Flowsheets (Taken 8/3/2023 1912)  Discharge to home or other facility with appropriate resources:   Identify barriers to discharge with patient and caregiver   Arrange for needed discharge resources and transportation as appropriate   Identify discharge learning needs (meds, wound care, etc)   Refer to discharge planning if patient needs post-hospital services based on physician order or complex needs related to functional status, cognitive ability or social support system     Problem: Safety - Adult  Goal: Free from fall injury  8/3/2023 0808 by Tavo Aguilar RN  Outcome: Progressing  Flowsheets (Taken 8/3/2023 0806)  Free From Fall Injury: Instruct family/caregiver on patient safety     Problem: ABCDS Injury Assessment  Goal: Absence of physical injury  8/3/2023 0808 by Tavo Aguilar RN  Outcome: Progressing  Flowsheets (Taken 8/3/2023 0806)  Absence of Physical Injury: Implement safety measures based on patient assessment     Problem: Nutrition Deficit:  Goal: Optimize nutritional status  Outcome: Progressing     Problem: Skin/Tissue Integrity - Adult  Goal: Skin integrity remains intact  Outcome: Progressing  Flowsheets  Taken 8/2/2023 0950 by Tavo Aguilar RN  Skin Integrity Remains Intact: Monitor for areas of redness and/or skin breakdown

## 2023-08-03 NOTE — PROGRESS NOTES
Patient admitted to rehab following CVA w/ R robbie. A/Ox4. Transfers with cane and gait belt. Mobility restrictions: wbat. On 5CC diet, tolerating well. Medications taken whole w/thins. On Lovenox for DVT prophylaxis. Skin: intact. Oxygen: RA. Has been continent of bowel and bladder. LBM 8/1/23. No complaints from pt this shift. Pt resting quietly in bed. Respirations easy and even. Bed alarms in use and call light in reach. Will monitor for safety.

## 2023-08-03 NOTE — PROGRESS NOTES
letter spacing; good word spacing; intermittent sloping of multiple word line sentences/phrases; Copying 4-7 line drawings: <20% spatial errors; Putting numbers on a clock:left>right spatial errors! Setting time on the clock: IND  8/3/2023: pt/family ed with dtr   Plan: Continue as per plan of care. Continued Tx Upon Discharge: ?    [] Yes [] No [x] TBD based on progress while on ARU [] Vital Stim indicated [] Other:   Estimated discharge date: TBD   Discharge recommendations:   [] Home independently  [x] Home with assistance []  24 hour supervision  [] ECF [] Other:     Additional information:     Interventions used during Rehab Stay:  [] Speech/Language Treatment  [] Instruction in HEP [] Group [] Dysphagia Treatment   [x] Cognitive Treatment   [] Other:          Electronically Signed by    Sarah De Guzman. Chepe,MS,CCC,SLP [id number]  Speech and Language Pathologist

## 2023-08-03 NOTE — PROGRESS NOTES
Physical Therapy  Facility/Department: [facility]  Rehabilitation Physical Therapy Treatment Note    NAME: Norman York  : 1955 (70 y.o.)  MRN: [id number]  CODE STATUS: Full Code    Date of Service: 8/3/23       Restrictions:  Restrictions/Precautions: Fall Risk  Position Activity Restriction  Other position/activity restrictions: R field cut     SUBJECTIVE  Subjective  Subjective: patient arrived in wheelchair following speech family ed session. Feels good, no pain, slept well, is hungry  Pain: No complaints of pain at this time        OBJECTIVE  Cognition  Overall Cognitive Status: Exceptions  Attention Span: Difficulty dividing attention  Safety Judgement: Decreased awareness of need for assistance  Problem Solving: Assistance required to identify errors made  Insights: Decreased awareness of deficits  Initiation: Requires cues for some  Cognition Comment: needed cues to manage [facility] and water temp; is @ 95% accurate w/ visual scanning tasks thus far. Her score on ACLS indicates she can live alone w/ wkly checks on the environment & her problem solving methods  Orientation  Overall Orientation Status: Within Functional Limits  Orientation Level: Oriented X4    Functional Mobility  Transfers  Surface: To chair with arms;From chair with arms; Wheelchair  Additional Factors: Increased time to complete  Device: [employer] to Stand  Assistance Level: Supervision  Stand to [employer] Level: Supervision  Bed To/From Chair  Assistance Level: Supervision  Skilled Clinical Factors: with single point cane.       Environmental Mobility  Ambulation  Surface: Level surface  Device: Cane  Distance: 250' with multiple turns and 2 surface transitions per walk, short distance in therapy gym, 100' on patio with SPC including uneven surfaces and ramp  Activity: Within Unit (on patio)  Assistance Level: Supervision  Gait Deviations: Decreased heel strike right;Narrow base of support;Path deviations  Skilled Clinical

## 2023-08-03 NOTE — PLAN OF CARE
Problem: Discharge Planning  Goal: Discharge to home or other facility with appropriate resources  8/2/2023 2007 by Deejay Lomeli RN  Outcome: Progressing  Flowsheets (Taken 8/2/2023 2007)  Discharge to home or other facility with appropriate resources:   Identify barriers to discharge with patient and caregiver   Identify discharge learning needs (meds, wound care, etc)     Problem: Safety - Adult  Goal: Free from fall injury  8/2/2023 2007 by Deejay Lomeli RN  Outcome: Progressing  Note: Fall risk band on patient. Orange light on outside of room. Non skid footwear in place. Alarms used appropriately. Patient instructed to call and wait for staff before getting up. Rounding done to anticipate needs. Appropriate safety devices used for transfers. Problem: ABCDS Injury Assessment  Goal: Absence of physical injury  8/2/2023 2007 by Deejay Lomeli RN  Outcome: Progressing  Flowsheets (Taken 8/2/2023 2007)  Absence of Physical Injury: Implement safety measures based on patient assessment     Problem: Musculoskeletal - Adult  Goal: Return mobility to safest level of function  Outcome: Progressing  Flowsheets  Taken 8/2/2023 2007 by Deejay Lomeli RN  Return Mobility to Safest Level of Function:   Assess patient stability and activity tolerance for standing, transferring and ambulating with or without assistive devices   Assist with transfers and ambulation using safe patient handling equipment as  Assist with transfers and ambulation using safe patient handling equipment as needed   Assess patient stability and activity tolerance for standing, transferring and ambulating with or without assistive devices   Skin Integrity Remains Intact: Monitor for areas of redness and/or skin breakdown  Note: Skin assessment completed on admission and every shift. Barrier wipes used in the event of incontinence. Pressure relief techniques used as needed while in chair and in bed.  Position changes encouraged at least every

## 2023-08-04 ENCOUNTER — APPOINTMENT (OUTPATIENT)
Dept: PHYSICAL THERAPY | Age: 68
End: 2023-08-04
Payer: MEDICARE

## 2023-08-04 PROCEDURE — 97530 THERAPEUTIC ACTIVITIES: CPT

## 2023-08-04 PROCEDURE — 97116 GAIT TRAINING THERAPY: CPT

## 2023-08-04 PROCEDURE — 97130 THER IVNTJ EA ADDL 15 MIN: CPT

## 2023-08-04 PROCEDURE — 97129 THER IVNTJ 1ST 15 MIN: CPT

## 2023-08-04 PROCEDURE — 6370000000 HC RX 637 (ALT 250 FOR IP): Performed by: PHYSICAL MEDICINE & REHABILITATION

## 2023-08-04 PROCEDURE — 97535 SELF CARE MNGMENT TRAINING: CPT

## 2023-08-04 PROCEDURE — 6360000002 HC RX W HCPCS: Performed by: PHYSICAL MEDICINE & REHABILITATION

## 2023-08-04 PROCEDURE — 97110 THERAPEUTIC EXERCISES: CPT

## 2023-08-04 PROCEDURE — 1280000000 HC REHAB R&B

## 2023-08-04 PROCEDURE — 94760 N-INVAS EAR/PLS OXIMETRY 1: CPT

## 2023-08-04 RX ORDER — CHLORTHALIDONE 25 MG/1
25 TABLET ORAL DAILY
Qty: 30 TABLET | Refills: 0 | Status: SHIPPED | OUTPATIENT
Start: 2023-08-04

## 2023-08-04 RX ORDER — CLOPIDOGREL BISULFATE 75 MG/1
75 TABLET ORAL DAILY
Qty: 14 TABLET | Refills: 0 | Status: SHIPPED | OUTPATIENT
Start: 2023-08-04 | End: 2023-08-18

## 2023-08-04 RX ORDER — ATORVASTATIN CALCIUM 80 MG/1
80 TABLET, FILM COATED ORAL NIGHTLY
Qty: 30 TABLET | Refills: 0 | Status: SHIPPED | OUTPATIENT
Start: 2023-08-04

## 2023-08-04 RX ORDER — ASPIRIN 81 MG/1
81 TABLET ORAL DAILY
Qty: 30 TABLET | Refills: 0 | Status: SHIPPED | OUTPATIENT
Start: 2023-08-04

## 2023-08-04 RX ORDER — LOSARTAN POTASSIUM 50 MG/1
50 TABLET ORAL DAILY
Qty: 30 TABLET | Refills: 0 | Status: SHIPPED | OUTPATIENT
Start: 2023-08-04

## 2023-08-04 RX ADMIN — CLOPIDOGREL BISULFATE 75 MG: 75 TABLET ORAL at 07:50

## 2023-08-04 RX ADMIN — LOSARTAN POTASSIUM 50 MG: 50 TABLET, FILM COATED ORAL at 07:50

## 2023-08-04 RX ADMIN — Medication 3 MG: at 19:40

## 2023-08-04 RX ADMIN — CHLORTHALIDONE 25 MG: 25 TABLET ORAL at 07:49

## 2023-08-04 RX ADMIN — ATORVASTATIN CALCIUM 80 MG: 80 TABLET, FILM COATED ORAL at 19:39

## 2023-08-04 RX ADMIN — ENOXAPARIN SODIUM 40 MG: 100 INJECTION SUBCUTANEOUS at 07:49

## 2023-08-04 RX ADMIN — ASPIRIN 81 MG: 81 TABLET, COATED ORAL at 07:50

## 2023-08-04 ASSESSMENT — 9 HOLE PEG TEST: TESTTIME_SECONDS: 35

## 2023-08-04 NOTE — FLOWSHEET NOTE
Patient admitted to ARU on 7/28 with Dx of acute ischemic stroke. A/Ox4. Transfers with cane + gait belt. Mobility restrictions: WBAT; unsteady at times. On regular; 5 carb diet, tolerating well. Medications taken whole with thins. On plavix, aspirin, lovenox for DVT prophylaxis. Requested for her prn melatonin at hs. Denies pain. Skin: intact. Oxygen: RA. LDA: none. Has been continent of bowel and continent of bladder. LBM 8/1/23. Chair/bed alarms in use and call light in reach. Will monitor for safety. Will be receiving her 30 day cardiac monitor today.

## 2023-08-04 NOTE — PROGRESS NOTES
CLINICAL PHARMACY NOTE: MEDS TO BEDS    Per patient request, all discharge medications will be transferred to [facility]

## 2023-08-04 NOTE — DISCHARGE SUMMARY
Physical Medicine & Rehabilitation  Discharge Summary     Patient Identification:  Rickey Rosario  : 1955  Admit date: 2023  Discharge date:  2023  Attending provider: Sandra Houser MD        Primary care provider: Thania Painter     Discharge Diagnoses:   Patient Active Problem List   Diagnosis    Blurred vision    Cataract of both eyes    Acute cerebrovascular accident (CVA) ([address])    Acute ischemic stroke [facility])       History of Present Illness/Acute Hospital Course:  Patient is a 69 yo F with pmh HTN, hepatitis, and recent right total knee arthroplasty (2023) who initially presented 2023 with right side weakness. CT head negative for hemorrhage but showed possible recent ischemic change in the posterior left occipital lobe. CTA head/neck showed moderate focal narrowing of the right supraclinoid ICA. MRI brain showed acute to subacute small cortical infarcts throughout the left parietal and occipital lobes and bilateral frontal lobes. Neurology has been consulted. Concern for cardioembolic etiology. Recommending DAPT x 21 days, statin, event monitor. Course complicated by HTN, hyperglycemia, hypercalcemia. Now presents to ARU with impaired mobility, self-care, and cognition below her baseline. Inpatient Rehabilitation Course:   Rickey Rosario is a 70 y.o. female admitted to inpatient rehabilitation on 2023 with Acute ischemic stroke ([address]). The patient participated in an aggressive multidisciplinary inpatient rehabilitation program involving 3 hours of therapy per day, at least 5 days per week. She made good progress with regard to vision, right side strength/coordination, balance, compensatory strategies, cognition. Now overall modified independent. Discharging home alone with prn assist from daughters. Home care services and DME ordered as below. Patient will follow-up with PCP, Neurology, Theresa Jean.      Impairments:  right hemiparesis, right sensory deficit, right VF cut (RLQ

## 2023-08-04 NOTE — PLAN OF CARE
Problem: Discharge Planning  Goal: Discharge to home or other facility with appropriate resources  Outcome: Progressing     Problem: Safety - Adult  Goal: Free from fall injury  Outcome: Progressing     Problem: ABCDS Injury Assessment  Goal: Absence of physical injury  Outcome: Progressing     Problem: Nutrition Deficit:  Goal: Optimize nutritional status  Outcome: Progressing     Problem: Neurosensory - Adult  Goal: Achieves stable or improved neurological status  Outcome: Progressing     Problem: Skin/Tissue Integrity - Adult  Goal: Skin integrity remains intact  Outcome: Progressing  Goal: Incisions, wounds, or drain sites healing without S/S of infection  Outcome: Progressing     Problem: Musculoskeletal - Adult  Goal: Return mobility to safest level of function  Outcome: Progressing  Goal: Maintain proper alignment of affected body part  Outcome: Progressing     Problem: Infection - Adult  Goal: Absence of infection during hospitalization  Outcome: Progressing     Problem: Metabolic/Fluid and Electrolytes - Adult  Goal: Electrolytes maintained within normal limits  Outcome: Progressing

## 2023-08-04 NOTE — PROGRESS NOTES
overall  [] Lethargic  [] Other:  Safety: [] No concerns at this time  [x] Reduced insight into deficits  []  Reduced safety awareness [] Not following call light procedures  [] Unable to Assess  [] Other:  Swallowing Precautions: Sit up for all meals and thereafter for 30 minutes  Barriers toward progress: none unless caregiver support           Date: 8/4/2023      Tx session 1 Tx session 2   Total Timed Code Min SLP Individual Minutes  Time In: 0915  Time out: 1000  Minutes:45  Coded treatment time  39 N/a   Group Treatment Minutes 0 0   Co-Treat Minutes 0 0   Variance/Reason:  N/a    Pain denied    Pain Intervention [] RN notified  [] Repositioned  [] Intervention offered and patient declined  [] N/A  [] Other: [] RN notified  [] Repositioned  [] Intervention offered and patient declined  [] N/A  [] Other:   Subjective     Seen in treatment office  Pt reproting did not sleep welll as they kept waking me up and putting stickers on me and checking heart monitor  Pt reporting now I have stickers on me that aren't even attached to the wires. Pt lifted shirt and showed SLP. SLP called RN to see if we could remove        Objective:  Goals       Dysphagia Goals  Continue regular diet    Goal met    Dysphagia Goals: The patient will tolerate recommended diet without observed clinical signs of aspiration D/c goal N/a     Cognitive-communication Goals: Pt goal is to return home; care for self and resume baseline responsibilities; walk   Therapy worked toward pt goal. Pt has been making progress toward her goal. Pt/family ed (dtr).  N/a     Goal 1: Pt will demosntrate improved recall of daily events; learned safety strategies; new information with compensatory strategies PRN with set up and intermittent assist    Reinforced compensatory strategies for working Memory for organizational task using written instructions: continued:   Extra time  Re-reading or use of underlining key words/phrases  Requesting repetition  Use of

## 2023-08-04 NOTE — PROGRESS NOTES
Tele d/c per order. Michelle Flowers from [facility] was here to put pt on 30 day event monitor prior to scheduled d/c tomorrow.

## 2023-08-04 NOTE — PROGRESS NOTES
6''  Device: Cane  Number of Curbs: 2  Additional Factors: Increased time to complete  Assistance Level: Modified independent  Skilled Clinical Factors: patient ascending well with use of single point cane, no LOB                    ASSESSMENT/PROGRESS TOWARDS GOALS       Assessment  Assessment: Ms. Kevin Jason has met all goals while on Rehab. She is now completing bed mobility with modified independence, transferring with modified independence, and ambulating community distance with single point cane with modified independence. Patient is steady with use of cane and was able to complete curb step and a full flight of stairs with use of cane. Patient is safe for discharge to home tomorrow, but will benefit from continued therapy to allow for return to independence. She demonstrates some mild right visual field deficits  Activity Tolerance: Patient tolerated treatment well  Discharge Recommendations: Patient would benefit from continued therapy after discharge;Continue to assess pending progress  PT Equipment Recommendations  Equipment Needed: No    Goals  Patient Goals   Patient Goals : \"get back to normal\" \"be able to take care of myself\"  Short Term Goals  Time Frame for Short Term Goals: upon discharge - approx 7-10 days from 7/28/2023  Short Term Goal 1: Bed mobillity with modified independence - met  Short Term Goal 2: Transfers with modified independence - met  Short Term Goal 3: Ambulate 150' with LRAD with modified independence.  - met  Short Term Goal 4: Ascend/descend curb step with LRAD with modified independence - met  Short Term Goal 5: Ascend/descend 12 steps with bilateral rails with modified independence - met  Long Term Goals  Time Frame for Long Term Goals : STG=LTG    PLAN OF CARE/SAFETY  Physcial Therapy Plan  General Plan:  minutes of therapy at least 5 out of 7 days a week  Days Per Week: 5 Days  Therapy Duration: 10 Days  Current Treatment Recommendations: Functional mobility

## 2023-08-04 NOTE — PLAN OF CARE
Problem: Discharge Planning  Goal: Discharge to home or other facility with appropriate resources  8/4/2023 1234 by Lorne Fermin RN  Outcome: Progressing  Flowsheets (Taken 8/4/2023 0800)  Discharge to home or other facility with appropriate resources: Identify barriers to discharge with patient and caregiver  8/4/2023 0123 by Nely Owens RN  Outcome: Progressing     Problem: Safety - Adult  Goal: Free from fall injury  8/4/2023 1234 by Lorne Fermin RN  Outcome: Progressing  Flowsheets (Taken 8/4/2023 1232)  Free From Fall Injury: Instruct family/caregiver on patient safety  8/4/2023 0123 by Nely Owens RN  Outcome: Progressing     Problem: ABCDS Injury Assessment  Goal: Absence of physical injury  8/4/2023 1234 by Lorne Fermin RN  Outcome: Progressing  Flowsheets (Taken 8/4/2023 1232)  Absence of Physical Injury: Implement safety measures based on patient assessment  8/4/2023 0123 by Nely Owens RN  Outcome: Progressing     Problem: Nutrition Deficit:  Goal: Optimize nutritional status  8/4/2023 1234 by Lorne Fermin RN  Outcome: Progressing  Flowsheets (Taken 8/4/2023 0920 by Malcolm Leigh RD)  Nutrient intake appropriate for improving, restoring, or maintaining nutritional needs:   Assess nutritional status and recommend course of action   Recommend appropriate diets, oral nutritional supplements, and vitamin/mineral supplements   Monitor oral intake, labs, and treatment plans   Provide specific nutrition education to patient or family as appropriate  8/4/2023 0123 by Nely Owens RN  Outcome: Progressing     Problem: Neurosensory - Adult  Goal: Achieves stable or improved neurological status  8/4/2023 1234 by Lorne Fermin RN  Outcome: Progressing  Flowsheets (Taken 8/4/2023 0800)  Achieves stable or improved neurological status: Assess for and report changes in neurological status  8/4/2023 0123 by Nely Owens RN  Outcome: Progressing     Problem: Skin/Tissue Integrity -

## 2023-08-04 NOTE — PROGRESS NOTES
Occupational Therapy  Facility/Department: [facility]  Rehabilitation Occupational Therapy Daily Treatment Note/ DC summary    Date: 23  Patient Name: Lesa Richmond       Room: V5M-7640/7089-50  MRN: [id number]  Account: [de-identified]   : 1955  (74 y.o.) Gender: female            PM session: met in room, she is IND to ambulate to/from bathrm & use toilet. Pt is IND w/ ambulating to sink to wash hands. Pt completed car t/f w/ MI, placed cane inside car and able to sit & swing method w/o cues. R hand dynamometer: 65,62,61--average: 63 lbs increased by 10 lbs! R hand 9 hole peg test--improved by 18 seconds (completed in 28 seconds--WNLs for age group). She was able to stand x 8 minutes IND'ly while playing [employer] in disco [place]. Pt required mod cues to learn new card game. Pt taken to PT side of gym. DC to home tomorrow w/ daughter providing assist prn, [facility]HA [facility]. Tx time: 40 min        Past Medical History:  has a past medical history of Hepatitis, HIV positive ([address]), and Hypertension. Past Surgical History:   has a past surgical history that includes Hysterectomy; tumor excision; and knee surgery (Right, ). Restrictions  Restrictions/Precautions: Fall Risk  Other position/activity restrictions: R field cut  Equipment Used: Bed, Wheelchair    Subjective  Subjective: met in room, pt seated in recliner, reported intermittent sleep overnight, said staff \"kept checking my monitor\"  Restrictions/Precautions: Fall Risk             Objective     Cognition  Overall Cognitive Status: Exceptions  Attention Span: Difficulty dividing attention  Problem Solving: Assistance required to identify errors made  Insights: Decreased awareness of deficits  Cognition Comment: Gets distracted which impacts her safety; she  is @ 95% accurate w/ visual scanning tasks thus far. Her score on ACLS indicates she can live alone w/ wkly checks on the environment & her problem solving methods.  Daughter Rene Cassidy

## 2023-08-04 NOTE — PROGRESS NOTES
70 y.o. female patient admitted to rehab with CVA. A/Ox4. Transfers with cane and GBx1. Mobility restrictions: WBAT, can be unsteady. On 5 carb select diet, tolerating well. Medications taken whole w/ thins. On Plavix, ASA, Lovenox for DVT prophylaxis. Skin: intact. Oxygen: RA. LDA: No PIV. Has been continent of bowel and continent of bladder. Wears brief. LBM 8/1. Chair/bed alarms in use and call light in reach.

## 2023-08-04 NOTE — PROGRESS NOTES
Comprehensive Nutrition Assessment    Type and Reason for Visit:  Reassess    Nutrition Recommendations/Plan:   Continue 5 carb diet. Monitor meal intakes. Honor food/fluid preferences as possible. Malnutrition Assessment:  Malnutrition Status:  No malnutrition (08/04/23 3873)    Context:  Acute Illness     Findings of the 6 clinical characteristics of malnutrition:  Energy Intake:  No significant decrease in energy intake  Weight Loss:  No significant weight loss     Body Fat Loss:  No significant body fat loss     Muscle Mass Loss:  No significant muscle mass loss    Fluid Accumulation:  No significant fluid accumulation     Strength:  Not Performed    Nutrition Assessment:    FU - Pt. admitted for acute ischemic stroke. Presented to ARU with Rt. sided weakess to address functional deficits. Continues on 5 carb diet. Meal intakes are adequate and meet EER. Weight stability remained throughout admission despite increased activity. Pt. is progressing well and is planning for discharge tomorrow. Plans to d/c home with home care and assistance from daughters. Nutrition Related Findings:    Labs reviewed. Meds reviewed. Skin intact. Last BM 8/1. Wound Type: None       Current Nutrition Intake & Therapies:    Average Meal Intake: %  Average Supplements Intake: None Ordered  ADULT DIET; Regular; 5 carb choices (75 gm/meal)    Anthropometric Measures:  Height: 5' 1\" (154.9 cm)  Ideal Body Weight (IBW): 105 lbs (48 kg)       Current Body Weight: 168 lb 3.4 oz (76.3 kg), 160.2 % IBW. Weight Source: Bed Scale  Current BMI (kg/m2): 31.8        Weight Adjustment For: No Adjustment                 BMI Categories: Obese Class 1 (BMI 30.0-34. 9)    Estimated Daily Nutrient Needs:  Energy Requirements Based On: Kcal/kg  Weight Used for Energy Requirements: Usual  Energy (kcal/day): 1500-1875kcal (20-25kcal/kg UBW)  Weight Used for Protein Requirements: Usual  Protein (g/day): 90-133g (1.2-1.5g/kg UBW)  Method

## 2023-08-04 NOTE — PROGRESS NOTES
Provision of Current Reconciled Medication List to Subsequent Provider at Discharge  []No, current reconciled medication list not provided to the subsequent provider. [x]Yes, current reconciled medication list provided to the subsequent provider. (**Select route of transmission below**)   [x] [address] [address] [employer]  [] Verbal (e.g. in person, telephone, video conferencing)  []Paper-based (e.g. fax, copies, printouts)   []Other Methods (e.g. texting, email, CDs)    Provision of Current Reconciled Medication List to Patient at Discharge  []No, current reconciled medication list not provided to the patient, family and/or caregiver. [x]Yes, current reconciled medication list provided to the patient, family and/or caregiver.  (**Select route of transmission below**)   [x] Via Electronic Health Record (e.g., electronic access to patient portal)   [] Via [employer]  [] Verbal (e.g. in person, telephone, video conferencing)  [x]Paper-based (e.g. fax, copies, printouts)   []Other Methods (e.g. texting, email, CDs)    High Risk Drug Classes:  Use and Indication    Is taking: Check if the pt is taking any medications by pharmacological classification, not how it is used, in the following classes  Indication noted: If column 1 is checked, check if there is an indication noted for all meds in the drug class Is taking  (check all that apply) Indication noted (check all that apply)   Antipsychotic [] []   Anticoagulant [x] []   Antibiotic [] []   Opioid [] []   Antiplatelet [] []   Hypoglycemic (including insulin) [] []   None of the above []     Special Treatments, Procedures, and Programs    Check all of the following treatments, procedures, and programs that apply at discharge. At Discharge (check all that apply)   Cancer Treatments   A1. Chemotherapy []           A2. IV []           A3. Oral []           A10.  Other []   B1. Radiation []

## 2023-08-05 VITALS
HEIGHT: 61 IN | TEMPERATURE: 98 F | OXYGEN SATURATION: 98 % | HEART RATE: 76 BPM | SYSTOLIC BLOOD PRESSURE: 146 MMHG | WEIGHT: 166.23 LBS | DIASTOLIC BLOOD PRESSURE: 87 MMHG | BODY MASS INDEX: 31.38 KG/M2 | RESPIRATION RATE: 16 BRPM

## 2023-08-05 PROCEDURE — 6360000002 HC RX W HCPCS: Performed by: PHYSICAL MEDICINE & REHABILITATION

## 2023-08-05 PROCEDURE — 6370000000 HC RX 637 (ALT 250 FOR IP): Performed by: PHYSICAL MEDICINE & REHABILITATION

## 2023-08-05 PROCEDURE — 94760 N-INVAS EAR/PLS OXIMETRY 1: CPT

## 2023-08-05 RX ADMIN — LOSARTAN POTASSIUM 50 MG: 50 TABLET, FILM COATED ORAL at 08:25

## 2023-08-05 RX ADMIN — ASPIRIN 81 MG: 81 TABLET, COATED ORAL at 08:25

## 2023-08-05 RX ADMIN — CHLORTHALIDONE 25 MG: 25 TABLET ORAL at 08:25

## 2023-08-05 RX ADMIN — ENOXAPARIN SODIUM 40 MG: 100 INJECTION SUBCUTANEOUS at 08:25

## 2023-08-05 RX ADMIN — CLOPIDOGREL BISULFATE 75 MG: 75 TABLET ORAL at 08:25

## 2023-08-05 NOTE — PLAN OF CARE
Problem: Safety - Adult  Goal: Free from fall injury  8/4/2023 2356 by Ping Murry, RN  Outcome: Progressing  Note: Fall risk band on patient. Orange light on outside of room. Non skid footwear in place. Alarms used appropriately. Patient instructed to call and wait for staff before getting up. Rounding done to anticipate needs. Appropriate safety devices used for transfers.

## 2023-08-05 NOTE — PROGRESS NOTES
Patient discharged to home with home health services. Discharge instructions given. Questions asked and answered. Personal items packed and taken with her. Home medication sent with her also. Daughter here to . Taken to vehicle via staff assisted wheelchair. Transferred into vehicle without difficulty.

## 2023-08-05 NOTE — PROGRESS NOTES
Patient admitted to rehab with CVA. A/Ox4. Transfers with Cane x 1. Mobility restrictions: WBAT, unsteady at times. On 5 Carb diet, tolerating well. Medications taken whole with thin liquids. On aspirin, lovenox and plavix for DVT prophylaxis. Skin: Intact. Oxygen: RA. LDA: None. Has been continent of bowel and bladder. LBM 8/1. Chair/bed alarms in use and call light in reach. Will monitor for safety.

## 2023-08-05 NOTE — CARE COORDINATION
08/04/23 1012   IMM Letter   IMM Letter given to Patient/Family/Significant other/Guardian/POA/by: presented to patient by jesus Gonzalez   IMM Letter date given: 08/03/23   IMM Letter time given: 1640
Chart reviewed. Met with patient to introduce  role, initiate discussion regarding DC planning and to inform of weekly Team Conferences to review her progress on Tuesday. SOCIAL WORK ASSESSMENT      GOAL:   To return home with miky. HOME SITUATION:  Patient lives alone with her small doggie (uses doggie pads and daughter lets out after work) in an apartment with 4 steps entry with bilateral rails or in the back with two steps with rails. Laundry in basement. Prior to admit, she was independent with her personal care needs, has three daughters that help when she needs something. She was going to Out Patient therapy at [facility] location. Pcp: Dr Nga Aguirre   last visit was 7-    Pharmacy:    [facility] on [address] [place] [address] Road OF FUNCTIONING:       PERSONAL CARE:     independent                                                                       DRIVES:  none. Daughters (3) take her to the store, she shops and they drive her home. She also has Medical Transportation under her insurance. Discussion held regarding Emergency Response Button and assistant with laundry when she goes home. Informed her of [address] with [facility] and she was most agreeable to this. FINANCES:  independent                                                                   MEALS:      independent                           GROCERY SHOPS:  dgtrs drive her but she does the shopping. DME CURRENTLY AT HOME:  wh walker, wheelchair, shower chair, HH Showerhead, cane. CURRENT HOME CARE/SERVICES:  She recently used [facility] and then transitioned to Outpatient services. She wants to use [facility] again if needed. Provided her with CMS star rated list of agencies       [address]:   [facility]. TEAM CONFERENCE DAY:   Tuesdays.   Informed her of weekly Team
DISCHARGE PLANNING:    Patient transferred to [address] [place] [address].. No needs.     #[phone]  Electronically signed by Taya Milton RN on 7/28/2023 at 10:42 AM
Discharge order noted. Patient is leaving with family at 12pm. Orders are already in for [facility] and it appears as if patient does not need durable medical equipment. If there is a last minute change to the plan for continued care please call the number listed below. Respectfully submitted,    Audra DURAND, [facility]   [phone]    Electronically signed by KIZZY Sawyer, LSW on 8/5/2023 at 9:00 AM
Karen Ortiz from [facility] has accepted this referral for home care.   [place], [place]     Case Management   [phone]    8/1/2023  2:58 PM
Late entry. Met with patient yesterday to review DC for Saturday 8-5-2023. She reports she is ready for DC. Family to pick her up around 10 am.  She [facility] [address]. She confirmed desire to use [facility]. The Plan for Transition of Care is related to the following treatment goals: to continue her progress in personal care and ambulation in home setting. The Patient  was provided with a choice of provider and agrees   with the discharge plan. [x] Yes [] No    Freedom of choice list was provided with basic dialogue that supports the patient's individualized plan of care/goals, treatment preferences and shares the quality data associated with the providers. [x] Yes [] No    She confirmed referral to [facility] for Emergency Response button and home care aide. She denies need for transportation.     [place], [place]     Case Management   [phone]    8/4/2023  10:15 AM
SOCIAL WORK DISCHARGE SUMMARY        DATE OF DISCHARGE:  Saturday, 8-5-2023      LOCATION:    Home        Discharging to Facility/ Agency   Name: [facility]  Address:   [address], [address] [place] [address] [address] [address]  Phone:  [phone]  Fax:  [phone] UP TIME:   10-12 [facility]        PHARMACY:  [facility]        DME:         none.     [place], [place]     Case Management   [phone]    8/4/2023  10:17 AM
Team Conference held today. Team reviewed barriers:  lives alone, cognition, newly diabetic, right hand limited function, easily distracted. Team recommends No DME at this time. Team recommends home care for SN/PT/OT/SP/HHA. Team also recommends Family Education. Met with patient to review. She agrees with this plan. She reports she has no issues with medication administration; states she takes her meds as she is supposed to. She confirmed her desire to [facility] as she used in the past.  The Plan for Transition of Care is related to the following treatment goals: to continue her progress in personal care and ambulation and speech needs in home setting. The Patient  was provided with a choice of provider and agrees   with the discharge plan. [x] Yes [] No    Freedom of choice list was provided with basic dialogue that supports the patient's individualized plan of care/goals, treatment preferences and shares the quality data associated with the providers. [x] Yes [] No  She asked that I update her daughter, Melissa Cunningham. Call placed to Melissa Cunningham to review DC planning. She can come in on Thursday for Family Education at 8:15 and can stay for two sessions before she needs to get to work. She is agreeable to [address] Aide and Emergency response button. She agrees with DC plan for Saturday. Did inform her of recommendation for oversight of medication taking. She states she is aware of the machine that dispenses and alarms go off to remind her to take it as her grandmother has one. She will work on this.   [place], [place]     Case [facility]   [phone]    8/1/2023  2:05 PM
[Negative] : Heme/Lymph

## 2023-08-05 NOTE — PLAN OF CARE
Problem: Safety - Adult  Goal: Free from fall injury  8/4/2023 2358 by Jeffery Fuller RN  Outcome: Progressing  Note: Fall risk band on patient. Orange light on outside of room. Non skid footwear in place. Alarms used appropriately. Patient instructed to call and wait for staff before getting up. Rounding done to anticipate needs. Appropriate safety devices used for transfers.

## 2023-08-05 NOTE — PLAN OF CARE
Problem: Discharge Planning  Goal: Discharge to home or other facility with appropriate resources  8/5/2023 0956 by Anabelle Melendez RN  Outcome: Adequate for Discharge  8/4/2023 2358 by Sheyla Velasquez RN  Outcome: Progressing  8/4/2023 2357 by Sheyla Velasquez RN  Outcome: Progressing  8/4/2023 2356 by Sheyla Velasquez RN  Outcome: Progressing  Flowsheets (Taken 8/4/2023 1930)  Discharge to home or other facility with appropriate resources: Identify barriers to discharge with patient and caregiver     Problem: Safety - Adult  Goal: Free from fall injury  8/5/2023 0956 by Anabelle Melendez RN  Outcome: Adequate for Discharge  8/4/2023 2358 by Sheyla Velasquez RN  Outcome: Progressing  Note: Fall risk band on patient. Orange light on outside of room. Non skid footwear in place. Alarms used appropriately. Patient instructed to call and wait for staff before getting up. Rounding done to anticipate needs. Appropriate safety devices used for transfers. 8/4/2023 2357 by Sheyla Velasquez RN  Outcome: Progressing  Note: Fall risk band on patient. Orange light on outside of room. Non skid footwear in place. Alarms used appropriately. Patient instructed to call and wait for staff before getting up. Rounding done to anticipate needs. Appropriate safety devices used for transfers. 8/4/2023 2356 by Sheyla Velasquez RN  Outcome: Progressing  Note: Fall risk band on patient. Orange light on outside of room. Non skid footwear in place. Alarms used appropriately. Patient instructed to call and wait for staff before getting up. Rounding done to anticipate needs. Appropriate safety devices used for transfers.       Problem: ABCDS Injury Assessment  Goal: Absence of physical injury  8/5/2023 0956 by Anabelle Melendez RN  Outcome: Adequate for Discharge  8/4/2023 2358 by Sheyla Velasquez RN  Outcome: Progressing  8/4/2023 2357 by Sheyla Velasquez RN  Outcome: Progressing  8/4/2023 2356 by Sheyla Velasquez RN  Outcome: Progressing     Problem: Nutrition Deficit:  Goal: Optimize nutritional

## 2023-08-05 NOTE — PLAN OF CARE
Problem: Safety - Adult  Goal: Free from fall injury  8/4/2023 2357 by Ping Murry, RN  Outcome: Progressing  Note: Fall risk band on patient. Orange light on outside of room. Non skid footwear in place. Alarms used appropriately. Patient instructed to call and wait for staff before getting up. Rounding done to anticipate needs. Appropriate safety devices used for transfers.

## 2023-08-05 NOTE — PROGRESS NOTES
Provision of Current Reconciled Medication List to Subsequent Provider at Discharge  []No, current reconciled medication list not provided to the subsequent provider. [x]Yes, current reconciled medication list provided to the subsequent provider. (**Select route of transmission below**)   [x] [address] [address] [employer] [facility]  [] Verbal (e.g. in person, telephone, video conferencing)  []Paper-based (e.g. fax, copies, printouts)   []Other Methods (e.g. texting, email, CDs)    Provision of Current Reconciled Medication List to Patient at Discharge  []No, current reconciled medication list not provided to the patient, family and/or caregiver. [x]Yes, current reconciled medication list provided to the patient, family and/or caregiver.  (**Select route of transmission below**)   [] Via Electronic Health Record (e.g., electronic access to patient portal)   [] Via [employer]  [] Verbal (e.g. in person, telephone, video conferencing)  [x]Paper-based (e.g. fax, copies, printouts)   []Other Methods (e.g. texting, email, CDs)    High Risk Drug Classes:  Use and Indication    Is taking: Check if the pt is taking any medications by pharmacological classification, not how it is used, in the following classes  Indication noted: If column 1 is checked, check if there is an indication noted for all meds in the drug class Is taking  (check all that apply) Indication noted (check all that apply)   Antipsychotic [] []   Anticoagulant [x] [x]   Antibiotic [] []   Opioid [] []   Antiplatelet [x] [x]   Hypoglycemic (including insulin) [] []   None of the above []     Special Treatments, Procedures, and Programs    Check all of the following treatments, procedures, and programs that apply at discharge. At Discharge (check all that apply)   Cancer Treatments   A1. Chemotherapy []           A2. IV []           A3. Oral []           A10.  Other []   B1. Radiation []

## 2023-08-07 ENCOUNTER — TELEPHONE (OUTPATIENT)
Dept: CARDIOLOGY CLINIC | Age: 68
End: 2023-08-07

## 2023-08-07 NOTE — TELEPHONE ENCOUNTER
RN on rehab called for 30 day monitor  Monitor placed by SHRUTI De Leon  Length of monitor 30 day  Monitor ordered by Regino/Jackie  Serial number [id number]  Kit ID [id number]  Activation successful prior to pt leaving hospital? Yes      Please add to spreadsheet, thanks

## 2023-08-16 ENCOUNTER — TELEPHONE (OUTPATIENT)
Dept: CARDIOLOGY CLINIC | Age: 68
End: 2023-08-16

## 2023-08-16 ENCOUNTER — NURSE ONLY (OUTPATIENT)
Dept: CARDIOLOGY CLINIC | Age: 68
End: 2023-08-16

## 2023-08-16 DIAGNOSIS — I63.9 ACUTE ISCHEMIC STROKE (HCC): Primary | ICD-10-CM

## 2023-08-16 DIAGNOSIS — I63.9 ACUTE CEREBROVASCULAR ACCIDENT (CVA) (HCC): ICD-10-CM

## 2023-08-16 PROCEDURE — 93270 REMOTE 30 DAY ECG REV/REPORT: CPT | Performed by: INTERNAL MEDICINE

## 2023-08-16 NOTE — PROGRESS NOTES
Patient came to our office with monitor that was sent to her home. Patient states that she was given a monitor at the hospital but was not sure how to work it. She called [employer] and was told to send that kit back. They sent her a new one that she brought in our office today. Place monitor on patient in office today. Paired sensor with monitor. Explained contents, application, use, charging, changing patches, documenting symptoms and return. Patient verbalize and confirmed understanding.

## 2023-08-16 NOTE — TELEPHONE ENCOUNTER
Monitor placed by [employer]  I am guessing that patient was registered by Storm Theodore when she sent back monitor that was given to her on a Saturday when she was discharged  Length of monitor 30 day monitor   Monitor ordered by Sharif Middleton M.D. for Dr. George Morgan to read  Serial number [id number]  Kit ID  [id number]  Activation successful prior to pt leaving office? Yes    Not sure if originally registered by [facility] or our office.

## 2023-09-11 NOTE — FLOWSHEET NOTE
Met: [] Not Met: [] Adjusted  3. Patient will demonstrate an increase in Strength to good proximal hip strength and control, within 5lb HHD in LE to allow for proper functional mobility as indicated by patients Functional Deficits. [] Progressing: [] Met: [] Not Met: [] Adjusted  4. Patient will return to 75%  functional activities without increased symptoms or restriction. [] Progressing: [] Met: [] Not Met: [] Adjusted  5. To walk without the walker and feel good.  (patient specific functional goal)    [] Progressing: [] Met: [] Not Met: [] Adjusted            Treatment/Activity Tolerance:  [x] Patient tolerated treatment well [] Patient limited by fatique  [] Patient limited by pain  [] Patient limited by other medical complications  [] Other:     Overall Progression Towards Functional goals/ Treatment Progress Update:  [x] Patient is progressing as expected towards functional goals listed. [] Progression is slowed due to complexities/Impairments listed. [] Progression has been slowed due to co-morbidities. [] Plan just implemented, too soon to assess goals progression <30days   [] Goals require adjustment due to lack of progress  [] Patient is not progressing as expected and requires additional follow up with physician  [] Other    Prognosis for POC: [x] Good [] Fair  [] Poor    Patient requires continued skilled intervention: [x] Yes  [] No        PLAN:   [x] Continue per plan of care [] Alter current plan (see comments)  [] Plan of care initiated [] Hold pending MD visit [] Discharge    Electronically signed by: Katherine Garner PT DPT, MS  [id number]    Note: If patient does not return for scheduled/recommended follow up visits, this note will serve as a discharge from care along with the most recent update on progress.

## 2023-09-12 ENCOUNTER — HOSPITAL ENCOUNTER (OUTPATIENT)
Dept: PHYSICAL THERAPY | Age: 68
Setting detail: THERAPIES SERIES
Discharge: HOME | End: 2023-09-12
Payer: MEDICARE

## 2023-09-12 PROCEDURE — 97140 MANUAL THERAPY 1/> REGIONS: CPT

## 2023-09-12 PROCEDURE — 97110 THERAPEUTIC EXERCISES: CPT

## 2023-09-15 ENCOUNTER — HOSPITAL ENCOUNTER (OUTPATIENT)
Dept: PHYSICAL THERAPY | Age: 68
Setting detail: THERAPIES SERIES
Discharge: HOME | End: 2023-09-15
Payer: MEDICARE

## 2023-09-15 PROCEDURE — 97110 THERAPEUTIC EXERCISES: CPT

## 2023-09-15 PROCEDURE — 97140 MANUAL THERAPY 1/> REGIONS: CPT

## 2023-09-15 NOTE — FLOWSHEET NOTE
[facility] - Outpatient Rehabilitation and Therapy, [facility]  [address] [place] [address], [address] [facility]  Phone: [phone]   Fax:     [phone]      Physical Therapy Treatment Note/ Progress Report:     Date:  09/15/2023    Patient Name:  Toney Austin    :  1955  MRN: [id number]    Pertinent Medical History:Additional Pertinent Hx: HTN, hepatitis    Medical/Treatment Diagnosis Information:  Medical Diagnosis: Hx of total knee replacement, right [Z96.651]  Treatment Diagnosis: Decreased functional mobility following right TKR    Insurance/Certification information:     Physician Information:  Carleen Carmichael MD  Plan of care signed (Y/N): faxed    Date of Patient follow up with Physician:      Progress Report: []  Yes  [x]  No     Date Range for reporting period:  Beginnin/15/2023  Ending:      Progress report due (10 Rx/or 30 days whichever is less):      Recertification due (POC duration/ or 90 days whichever is less):      Visit # POC/Insurance Allowable Auth Needed    BOMN []Yes   [x]No     Latex Allergy:  [x]NO      []YES  Preferred Language for Healthcare:   [x]English       []Other:     Relevant Medical History:Additional Pertinent Hx: HTN, hepatitis  Functional Scale/Score: WOMAC  19     HPI -    Right TKR 23 - to [facility]  for two weeks, then home therapy for 6 days then sent for OPPT. SUBJECTIVE:   -  Pain is slowly getting better. Pt was 3 hours late for appt. Pain  4/10 . Reviewed schedule.  -  Pt arrived for appt - walked back to gym - PT noted she was not moving well - pt reported feeling stiff and having a difficult day. Placed on Nustep -  Done without difficulty.   When transferring to the chair , this PT noted that pt was not consistently using right arm and leg as per normal.  Pt reports taking BP meds per usual -  PT  178/102 - no facial droop - able to move both limbs actively when asked

## 2023-09-19 ENCOUNTER — HOSPITAL ENCOUNTER (OUTPATIENT)
Dept: PHYSICAL THERAPY | Age: 68
Setting detail: THERAPIES SERIES
Discharge: HOME | End: 2023-09-19
Payer: MEDICARE

## 2023-09-19 PROCEDURE — 97110 THERAPEUTIC EXERCISES: CPT

## 2023-09-19 PROCEDURE — 97530 THERAPEUTIC ACTIVITIES: CPT

## 2023-09-19 NOTE — FLOWSHEET NOTE
strengthening, flexibility, endurance, ROM for improvements in LE, proximal hip, and core control with self care, mobility, lifting, ambulation. [x] (44313) Provided verbal/tactile cueing for activities related to improving balance, coordination, kinesthetic sense, posture, motor skill, proprioception  to assist with LE, proximal hip, and core control in self care, mobility, lifting, ambulation and eccentric single leg control. [address] and Therapeutic Activities:    [x] (59503 or 79982) Provided verbal/tactile cueing for activities related to improving balance, coordination, kinesthetic sense, posture, motor skill, proprioception and motor activation to allow for proper function of core, proximal hip and LE with self care and ADLs and functional mobility.    [x] (40533) Gait Re-education- Provided training and instruction to the patient for proper LE, core and proximal hip recruitment and positioning and eccentric body weight control with ambulation re-education including up and down stairs     Home Exercise Program:    [x] (12800) Reviewed/Progressed HEP activities related to strengthening, flexibility, endurance, ROM of core, proximal hip and LE for functional self-care, mobility, lifting and ambulation/stair navigation   [x] (30899)Reviewed/Progressed HEP activities related to improving balance, coordination, kinesthetic sense, posture, motor skill, proprioception of core, proximal hip and LE for self care, mobility, lifting, and ambulation/stair navigation      Manual Treatments:  PROM / STM / Oscillations-Mobs:  G-I, II, III, IV (PA's, Inf., Post.)  [x] (33424) Provided manual therapy to mobilize LE, proximal hip and/or LS spine soft tissue/joints for the purpose of modulating pain, promoting relaxation,  increasing ROM, reducing/eliminating soft tissue swelling/inflammation/restriction, improving soft tissue extensibility and allowing for proper ROM for normal function with self care, mobility, lifting

## 2023-09-22 ENCOUNTER — HOSPITAL ENCOUNTER (OUTPATIENT)
Dept: PHYSICAL THERAPY | Age: 68
Setting detail: THERAPIES SERIES
Discharge: HOME | End: 2023-09-22
Payer: MEDICARE

## 2023-09-22 PROCEDURE — 97530 THERAPEUTIC ACTIVITIES: CPT

## 2023-09-22 PROCEDURE — 97110 THERAPEUTIC EXERCISES: CPT

## 2023-09-22 NOTE — FLOWSHEET NOTE
improving soft tissue extensibility and allowing for proper ROM for normal function with self care, mobility, lifting and ambulation. Modalities:  [x] (46202) Vasopneumatic compression: Utilized vasopneumatic compression to decrease edema / swelling for the purpose of improving mobility and quad tone / recruitment which will allow for increased overall function including but not limited to self-care, transfers, ambulation, and ascending / descending stairs. Charges:  Timed Code Treatment Minutes: 45   Total Treatment Minutes: 55       [x] XR(65201) x   2  [] Dry needle 1 or 2 Muscles (30535)  [] NMR (93018) x     [] Dry needle 3+ Muscles (94048)  [] Manual (78320) x    1 [] Ultrasound (26927) x  [x] TA (19742) x  1   [] Mech Traction (76782)  [] ES(attended) (93959)     [] ES (un) (38059):   [] Vasopump (14629) [] Ionto (43328)   [] Other:       ASSESSMENT: Pt post TKR Right knee. Doing well with functional mobility and ROM. Will benefit from strengthening, continued ROM and functional mobility to maximize mobility, safety and independence   7/21 - flexion looks great, continue with strengthening and functional mobility. 9/12 - doing well with functional mobility - ROM much improved as well - will benefit from strengthening and functional progression as well as end range extension stretching  9/15 -  doing well with functional mobility - will continue to progress. 9/19 -  shanon session well - doing well with functional gait. 9/22 -  functional mobility and endurance is progressing well - spoke with pt about continuing to work on endurance    GOALS:  Patient stated goal: to get rid of the pain  [x] Progressing: [] Met: [] Not Met: [] Adjusted     Therapist goals for Patient:   Short Term Goals: To be achieved in: 2 weeks  1. Independent in HEP and progression per patient tolerance, in order to prevent re-injury. [x] Progressing: [] Met: [] Not Met: [] Adjusted  2.  Patient will have a decrease in pain

## 2023-09-25 PROCEDURE — 93272 ECG/REVIEW INTERPRET ONLY: CPT | Performed by: INTERNAL MEDICINE

## 2023-09-26 ENCOUNTER — HOSPITAL ENCOUNTER (OUTPATIENT)
Dept: PHYSICAL THERAPY | Age: 68
Setting detail: THERAPIES SERIES
Discharge: HOME | End: 2023-09-26
Payer: MEDICARE

## 2023-09-26 DIAGNOSIS — I63.9 IMPENDING CEREBROVASCULAR ACCIDENT (HCC): Primary | ICD-10-CM

## 2023-09-26 PROCEDURE — 97530 THERAPEUTIC ACTIVITIES: CPT

## 2023-09-26 PROCEDURE — 97110 THERAPEUTIC EXERCISES: CPT

## 2023-09-26 NOTE — FLOWSHEET NOTE
[facility] - Outpatient Rehabilitation and Therapy, [facility]  [address] [facility], [address] [facility]  Phone: [phone]   Fax:     [phone]      Physical Therapy Treatment Note/ Progress Report:     Date:  2023    Patient Name:  Bertina Frankel    :  1955  MRN: [id number]    Pertinent Medical History:Additional Pertinent Hx: HTN, hepatitis    Medical/Treatment Diagnosis Information:  Medical Diagnosis: Hx of total knee replacement, right [Z96.651]  Treatment Diagnosis: Decreased functional mobility following right TKR    Insurance/Certification information:     [facility] Medicare  Physician Information:  Juan Pablo Saldana MD  Plan of care signed (Y/N): faxed    Date of Patient follow up with Physician:      Progress Report: []  Yes  [x]  No     Date Range for reporting period:  Beginnin2023  Ending:      Progress report due (10 Rx/or 30 days whichever is less):      Recertification due (POC duration/ or 90 days whichever is less):      Visit # POC/Insurance Allowable Auth Needed    BOMN []Yes   [x]No     Latex Allergy:  [x]NO      []YES  Preferred Language for Healthcare:   [x]English       []Other:     Relevant Medical History:Additional Pertinent Hx: HTN, hepatitis  Functional Scale/Score: WOMAC  19     HPI -    Right TKR 23 - to [facility]  for two weeks, then home therapy for 6 days then sent for OPPT. SUBJECTIVE:   -  Pain is slowly getting better. Pt was 3 hours late for appt. Pain  4/10 . Reviewed schedule.  -  Pt arrived for appt - walked back to gym - PT noted she was not moving well - pt reported feeling stiff and having a difficult day. Placed on Nustep -  Done without difficulty.   When transferring to the chair , this PT noted that pt was not consistently using right arm and leg as per normal.  Pt reports taking BP meds per usual -  PT  178/102 - no facial droop - able to move both limbs

## 2023-09-29 ENCOUNTER — APPOINTMENT (OUTPATIENT)
Dept: PHYSICAL THERAPY | Age: 68
End: 2023-09-29
Payer: MEDICARE

## 2023-10-03 ENCOUNTER — HOSPITAL ENCOUNTER (OUTPATIENT)
Dept: PHYSICAL THERAPY | Age: 68
Setting detail: THERAPIES SERIES
Discharge: HOME | End: 2023-10-03
Payer: MEDICARE

## 2023-10-03 PROCEDURE — 97110 THERAPEUTIC EXERCISES: CPT

## 2023-10-03 PROCEDURE — 97530 THERAPEUTIC ACTIVITIES: CPT

## 2023-10-06 ENCOUNTER — APPOINTMENT (OUTPATIENT)
Dept: PHYSICAL THERAPY | Age: 68
End: 2023-10-06
Payer: MEDICARE

## 2023-10-10 ENCOUNTER — APPOINTMENT (OUTPATIENT)
Dept: PHYSICAL THERAPY | Age: 68
End: 2023-10-10
Payer: MEDICARE

## 2023-10-13 ENCOUNTER — APPOINTMENT (OUTPATIENT)
Dept: PHYSICAL THERAPY | Age: 68
End: 2023-10-13
Payer: MEDICARE

## 2024-08-20 ENCOUNTER — HOSPITAL ENCOUNTER (OUTPATIENT)
Dept: PHYSICAL THERAPY | Age: 69
Setting detail: THERAPIES SERIES
Discharge: HOME | End: 2024-08-20
Payer: MEDICARE

## 2024-08-20 DIAGNOSIS — M25.60 STIFFNESS IN JOINT: ICD-10-CM

## 2024-08-20 DIAGNOSIS — Z74.09 DECREASED FUNCTIONAL MOBILITY AND ENDURANCE: ICD-10-CM

## 2024-08-20 DIAGNOSIS — R53.1 WEAKNESS: Primary | ICD-10-CM

## 2024-08-20 DIAGNOSIS — R52 PAIN: ICD-10-CM

## 2024-08-20 DIAGNOSIS — R26.89 FUNCTIONAL GAIT ABNORMALITY: ICD-10-CM

## 2024-08-20 PROCEDURE — 97161 PT EVAL LOW COMPLEX 20 MIN: CPT

## 2024-08-20 PROCEDURE — 97110 THERAPEUTIC EXERCISES: CPT

## 2024-08-20 NOTE — PLAN OF CARE
[facility]- Outpatient Rehabilitation and Therapy [address] [place] [address] office: [phone] fax: [phone]     Physical Therapy Initial Evaluation Certification      Dear Juarez Palma MD,    We had the pleasure of evaluating the following patient for physical therapy services at [facility].  A summary of our findings can be found in the initial assessment below.  This includes our plan of care.  If you have any questions or concerns regarding these findings, please do not hesitate to contact me at the office phone number listed above.  Thank you for the referral.     Physician Signature:_______________________________Date:__________________  By signing above (or electronic signature), therapist’s plan is approved by physician       Physical Therapy: TREATMENT/PROGRESS NOTE   Patient: Rylee Bai (69 y.o. female)   Examination Date: 2024   :  1955 MRN: [id number]   Visit #:   Insurance Allowable Auth Needed   MN []Yes    [x]No    Insurance: Payor: [facility] MEDICARE / Plan: [employer] DUAL COMPLETE / Product Type: *No Product type* /   Insurance ID: [id number] - (Medicare Managed)  Secondary Insurance (if applicable): [facility] *   Treatment Diagnosis:     ICD-10-CM    1. Weakness  R53.1       2. Stiffness in joint  M25.60       3. Functional gait abnormality  R26.89       4. Decreased functional mobility and endurance  Z74.09       5. Pain  R52          Medical Diagnosis:  Right knee pain [M25.561]   Referring Physician: Juarez Palma MD  PCP: Donell John MD     Plan of care signed (Y/N):     Date of Patient follow up with Physician:      Plan of Care Report: EVAL today  POC update due: (10 visits /OR AUTH LIMITS, whichever is less)  2024                                             Medical History:  Comorbidities:  Hypertension  Stroke/TIA  Relevant Medical History: R TKR May 2023

## 2024-08-23 ENCOUNTER — HOSPITAL ENCOUNTER (OUTPATIENT)
Dept: PHYSICAL THERAPY | Age: 69
Setting detail: THERAPIES SERIES
Discharge: HOME | End: 2024-08-23
Payer: MEDICARE

## 2024-08-23 PROCEDURE — 97110 THERAPEUTIC EXERCISES: CPT

## 2024-08-23 PROCEDURE — 97112 NEUROMUSCULAR REEDUCATION: CPT

## 2024-08-23 NOTE — FLOWSHEET NOTE
[facility]- Outpatient Rehabilitation and Therapy [address] [place] [address] office: [phone] fax: [phone]         Physical Therapy: TREATMENT/PROGRESS NOTE   Patient: Rylee Bai (69 y.o. female)   Examination Date: 2024   :  1955 MRN: [id number]   Visit #:   Insurance Allowable Auth Needed   MN []Yes    [x]No    Insurance: Payor: [facility] MEDICARE / Plan: [email or web address] DUAL COMPLETE / Product Type: *No Product type* /   Insurance ID: [id number] - (Medicare Managed)  Secondary Insurance (if applicable): [facility] *   Treatment Diagnosis:     ICD-10-CM    1. Weakness  R53.1       2. Stiffness in joint  M25.60       3. Functional gait abnormality  R26.89       4. Decreased functional mobility and endurance  Z74.09       5. Pain  R52          Medical Diagnosis:  Right knee pain [M25.561]   Referring Physician: Juarez Palma MD  PCP: Donell John MD     Plan of care signed (Y/N):     Date of Patient follow up with Physician:      Plan of Care Report: EVAL today  POC update due: (10 visits /OR AUTH LIMITS, whichever is less)  2024                                             Medical History:  Comorbidities:  Hypertension  Stroke/TIA  Relevant Medical History: R TKR May 2023                                         Precautions/ Contra-indications:           Latex allergy:  NO  Pacemaker:    NO  Contraindications for Manipulation: NA  Date of Surgery: R TKR May 2023   Other:    Red Flags:  None    Suicide Screening:   The patient did not verbalize a primary behavioral concern, suicidal ideation, suicidal intent, or demonstrate suicidal behaviors.    Preferred Language for Healthcare:   [x] English       [] other:    SUBJECTIVE EXAMINATION     Patient stated complaint: R TKR May 2023, stroke about 1 year ago and last month was walking out of grocery store R knee popped and almost buckled. Saw MD and no damage to knee or TKR but dx with Quad tendon

## 2024-08-27 ENCOUNTER — APPOINTMENT (OUTPATIENT)
Dept: PHYSICAL THERAPY | Age: 69
End: 2024-08-27
Payer: MEDICARE

## 2024-08-29 ENCOUNTER — HOSPITAL ENCOUNTER (OUTPATIENT)
Dept: PHYSICAL THERAPY | Age: 69
Setting detail: THERAPIES SERIES
Discharge: HOME | End: 2024-08-29
Payer: MEDICARE

## 2024-08-29 PROCEDURE — 97110 THERAPEUTIC EXERCISES: CPT

## 2024-08-29 PROCEDURE — 97112 NEUROMUSCULAR REEDUCATION: CPT

## 2024-08-29 PROCEDURE — 97530 THERAPEUTIC ACTIVITIES: CPT

## 2024-08-29 NOTE — FLOWSHEET NOTE
[facility]- Outpatient Rehabilitation and Therapy [address] [place] [address] office: [phone] fax: [phone]         Physical Therapy: TREATMENT/PROGRESS NOTE   Patient: Rylee Bai (69 y.o. female)   Examination Date: 2024   :  1955 MRN: [id number]   Visit #: 3 /8  Insurance Allowable Auth Needed   MN []Yes    [x]No    Insurance: Payor: [facility] MEDICARE / Plan: [employer] DUAL COMPLETE / Product Type: *No Product type* /   Insurance ID: [id number] - (Medicare Managed)  Secondary Insurance (if applicable): [facility] *   Treatment Diagnosis:     ICD-10-CM    1. Weakness  R53.1       2. Stiffness in joint  M25.60       3. Functional gait abnormality  R26.89       4. Decreased functional mobility and endurance  Z74.09       5. Pain  R52          Medical Diagnosis:  Right knee pain [M25.561]   Referring Physician: Juarez Palma MD  PCP: Donell John MD     Plan of care signed (Y/N): yes     Date of Patient follow up with Physician:      Plan of Care Report: NO  POC update due: (10 visits /OR AUTH LIMITS, whichever is less)  2024                                             Medical History:  Comorbidities:  Hypertension  Stroke/TIA  Relevant Medical History: R TKR May 2023                                         Precautions/ Contra-indications:           Latex allergy:  NO  Pacemaker:    NO  Contraindications for Manipulation: NA  Date of Surgery: R TKR May 2023   Other:    Red Flags:  None    Suicide Screening:   The patient did not verbalize a primary behavioral concern, suicidal ideation, suicidal intent, or demonstrate suicidal behaviors.    Preferred Language for Healthcare:   [x] English       [] other:    SUBJECTIVE EXAMINATION     Patient stated complaint: R TKR May 2023, stroke about 1 year ago and last month was walking out of grocery store R knee popped and almost buckled. Saw MD and no damage to knee or TKR but dx with Quad tendon strain.

## 2024-09-03 ENCOUNTER — HOSPITAL ENCOUNTER (OUTPATIENT)
Dept: PHYSICAL THERAPY | Age: 69
Setting detail: THERAPIES SERIES
End: 2024-09-03
Payer: MEDICARE

## 2024-09-05 ENCOUNTER — HOSPITAL ENCOUNTER (OUTPATIENT)
Dept: PHYSICAL THERAPY | Age: 69
Setting detail: THERAPIES SERIES
End: 2024-09-05
Payer: MEDICARE

## 2024-09-10 ENCOUNTER — HOSPITAL ENCOUNTER (OUTPATIENT)
Dept: PHYSICAL THERAPY | Age: 69
Setting detail: THERAPIES SERIES
End: 2024-09-10
Payer: MEDICARE

## 2024-09-12 ENCOUNTER — HOSPITAL ENCOUNTER (OUTPATIENT)
Dept: PHYSICAL THERAPY | Age: 69
Setting detail: THERAPIES SERIES
End: 2024-09-12
Payer: MEDICARE

## 2024-09-17 ENCOUNTER — APPOINTMENT (OUTPATIENT)
Dept: PHYSICAL THERAPY | Age: 69
End: 2024-09-17
Payer: MEDICARE

## 2024-09-17 ENCOUNTER — HOSPITAL ENCOUNTER (OUTPATIENT)
Dept: PHYSICAL THERAPY | Age: 69
Setting detail: THERAPIES SERIES
Discharge: HOME | End: 2024-09-17
Payer: MEDICARE

## 2024-09-17 PROCEDURE — 97530 THERAPEUTIC ACTIVITIES: CPT

## 2024-09-17 PROCEDURE — 97110 THERAPEUTIC EXERCISES: CPT

## 2024-09-17 PROCEDURE — 97112 NEUROMUSCULAR REEDUCATION: CPT

## 2024-09-19 ENCOUNTER — HOSPITAL ENCOUNTER (OUTPATIENT)
Dept: PHYSICAL THERAPY | Age: 69
Setting detail: THERAPIES SERIES
Discharge: HOME | End: 2024-09-19
Payer: MEDICARE

## 2024-09-19 ENCOUNTER — APPOINTMENT (OUTPATIENT)
Dept: PHYSICAL THERAPY | Age: 69
End: 2024-09-19
Payer: MEDICARE

## 2024-09-19 PROCEDURE — 97140 MANUAL THERAPY 1/> REGIONS: CPT

## 2024-09-19 PROCEDURE — 97530 THERAPEUTIC ACTIVITIES: CPT

## 2024-09-19 PROCEDURE — 97110 THERAPEUTIC EXERCISES: CPT

## 2024-09-26 ENCOUNTER — HOSPITAL ENCOUNTER (OUTPATIENT)
Dept: PHYSICAL THERAPY | Age: 69
Setting detail: THERAPIES SERIES
Discharge: HOME | End: 2024-09-26
Payer: MEDICARE

## 2024-09-26 PROCEDURE — 97140 MANUAL THERAPY 1/> REGIONS: CPT

## 2024-09-26 PROCEDURE — 97110 THERAPEUTIC EXERCISES: CPT

## 2024-09-26 PROCEDURE — 97530 THERAPEUTIC ACTIVITIES: CPT
